# Patient Record
Sex: MALE | Race: BLACK OR AFRICAN AMERICAN | NOT HISPANIC OR LATINO | URBAN - METROPOLITAN AREA
[De-identification: names, ages, dates, MRNs, and addresses within clinical notes are randomized per-mention and may not be internally consistent; named-entity substitution may affect disease eponyms.]

---

## 2021-03-14 ENCOUNTER — EMERGENCY (EMERGENCY)
Facility: HOSPITAL | Age: 66
LOS: 1 days | Discharge: HOME | End: 2021-03-14
Admitting: EMERGENCY MEDICINE
Payer: COMMERCIAL

## 2021-03-14 VITALS
DIASTOLIC BLOOD PRESSURE: 80 MMHG | SYSTOLIC BLOOD PRESSURE: 190 MMHG | RESPIRATION RATE: 16 BRPM | TEMPERATURE: 98 F | HEART RATE: 66 BPM | OXYGEN SATURATION: 100 %

## 2021-03-14 PROCEDURE — 73610 X-RAY EXAM OF ANKLE: CPT | Mod: 26,LT

## 2021-03-14 PROCEDURE — 73590 X-RAY EXAM OF LOWER LEG: CPT | Mod: 26,LT

## 2021-03-14 PROCEDURE — 73630 X-RAY EXAM OF FOOT: CPT | Mod: 26,LT

## 2021-03-14 PROCEDURE — 99284 EMERGENCY DEPT VISIT MOD MDM: CPT

## 2021-03-14 RX ORDER — OXYCODONE HYDROCHLORIDE 5 MG/1
1 TABLET ORAL
Qty: 12 | Refills: 0
Start: 2021-03-14 | End: 2021-03-16

## 2021-03-14 RX ORDER — OXYCODONE AND ACETAMINOPHEN 5; 325 MG/1; MG/1
2 TABLET ORAL EVERY 4 HOURS
Refills: 0 | Status: DISCONTINUED | OUTPATIENT
Start: 2021-03-14 | End: 2021-03-14

## 2021-03-14 RX ORDER — OXYCODONE AND ACETAMINOPHEN 5; 325 MG/1; MG/1
2 TABLET ORAL ONCE
Refills: 0 | Status: DISCONTINUED | OUTPATIENT
Start: 2021-03-14 | End: 2021-03-14

## 2021-03-14 RX ADMIN — OXYCODONE AND ACETAMINOPHEN 2 TABLET(S): 5; 325 TABLET ORAL at 15:47

## 2021-03-14 NOTE — ED ADULT TRIAGE NOTE - CHIEF COMPLAINT QUOTE
Pt states that he tripped in [employer] and injured left ankle, pt denies LOC or head strike.  Past medical history: HTN, DM2

## 2021-03-14 NOTE — ED PROVIDER NOTE - PATIENT PORTAL LINK FT
You can access the FollowMyHealth Patient Portal offered by [facility] by registering at the following website: [email or web address]. By joining [email or web address]’s FollowMyHealth portal, you will also be able to view your health information using other applications (apps) compatible with our system.

## 2021-03-14 NOTE — ED PROVIDER NOTE - OBJECTIVE STATEMENT
64 Y/O M PMH HTN DM  states that 66 Y/O M PMH HTN DM  states that he was in [employer] when the pew rope got caught on his L ankle. Pt states this caused him to twist and hit the ground. Pt denies head trauma, LOC or A/C use. Pt states that he has 8/10 pain at his L ankle and has been unable to ambulate since the injury. Pt denies CP or Palpitations, pt denies any other sx or acute complaints.

## 2021-03-14 NOTE — ED PROVIDER NOTE - PROGRESS NOTE DETAILS
NEELAM Campoverde: Discussed with Podiatry resident, X-ray pending official read but has an obvious distal fibula fx. NEELAM Campoverde: Pt passed trial of crutches while in ED. Pt states he has 4 steps if he goes inside through his garage. Pt states his bedroom is upstairs but states he has a stair lift for his wife which he can also use. Pt  advised risks and benefits of staying in the hospital for PT eval and risk of COVID-19, pt prefers to go home on crutches and follow up with Podiatry.

## 2021-03-14 NOTE — CONSULT NOTE ADULT - SUBJECTIVE AND OBJECTIVE BOX
Podiatry pager #: [phone]/ [phone]    Patient is a 65y old  Male who presents with a chief complaint of left ankle fracture    HPI:  66 Y/O M PMH HTN DM  states that he fell earlier today at [employer] in an inverted manner and hurt his left ankle. Has had pain w/ ambulation.,      PAST MEDICAL & SURGICAL HISTORY:      MEDICATIONS  (STANDING):    MEDICATIONS  (PRN):      Allergies    aspirin (Unknown)    Intolerances        VITALS:    Vital Signs Last 24 Hrs  T(C): 36.9 (14 Mar 2021 15:07), Max: 36.9 (14 Mar 2021 15:07)  T(F): 98.4 (14 Mar 2021 15:07), Max: 98.4 (14 Mar 2021 15:07)  HR: 66 (14 Mar 2021 15:07) (66 - 66)  BP: 190/80 (14 Mar 2021 15:07) (190/80 - 190/80)  BP(mean): --  RR: 16 (14 Mar 2021 15:07) (16 - 16)  SpO2: 100% (14 Mar 2021 15:07) (100% - 100%)    LABS:                CAPILLARY BLOOD GLUCOSE              LOWER EXTREMITY PHYSICAL EXAM:    Vascular: DP/PT 2/4, B/L, CFT <3 seconds B/L, Temperature gradient WNL, B/L.   Neuro: Epicritic sensation intact to the level of digits, B/L.  Musculoskeletal/Ortho: Left ankle swelling, pain w/ palpation along distal lateral malleolus only, minimal pain w/ DF and PF of ankle. No open lesion, abrasions and no concern for compartment syndrome.        RADIOLOGY & ADDITIONAL STUDIES:    < from: Xray Tibia + Fibula 2 Views, Left (03.14.21 @ 16:45) >    INTERPRETATION:  CLINICAL INFORMATION: Status post fall. Pain.    STUDY: AP and lateral views of left tibia/fibula, 3 views of left ankle and foot.    COMPARISON: None.    FINDINGS:    Oblique, minimally displaced acute fracture of the distal fibula.  Soft tissue swelling of the ankle.  Joint spaces are maintained.  Degenerative changes with osteophyte and enthesophytes.    IMPRESSION:    Robert B ankle fracture.      < end of copied text >

## 2021-03-14 NOTE — ED ADULT NURSE NOTE - CHIEF COMPLAINT QUOTE
Pt states that he tripped in [employer] and injured left ankle, pt denies LOC or head strike.  Past medical history: HTN, DM2

## 2021-03-14 NOTE — ED PROVIDER NOTE - NSFOLLOWUPINSTRUCTIONS_ED_ALL_ED_FT
Your ankle is broken, you cannot put any weight on it at all. Use the crutches to get around. Follow up with Dr. Gigi Leon in the next week. Take Ibuprofen or Tylenol OTC as needed for pain and take Oxycodone every 6 hours as needed for severe or persistent pain. This medication can make you drowsy, do not drive or drink alcohol after use. Advance activity as tolerated.  Continue all previously prescribed medications as directed.  Follow up with your primary care physician in 48-72 hours- bring copies of your results.  Return to the ER for worsening or persistent symptoms, and/or ANY NEW OR CONCERNING SYMPTOMS such as severe pain, numbness, weakness, if you cannot use the crutches or for any other symptoms that concern you. If you have issues obtaining follow up, please call: [phone] to obtain a doctor or specialist who takes your insurance in your area.  You may call [phone] to make an appointment with the internal medicine clinic.

## 2021-03-14 NOTE — ED PROVIDER NOTE - PHYSICAL EXAMINATION
A comprehensive trauma exam was performed. No hematoma or skull tenderness or deformity, no tenderness or abnormality of facial bones. No spinous process or paraspinal muscle tenderness of the cervical, lumbar or thoracic spine. No rib tenderness/crepitus. Abdomen is soft, non-tender no guarding. Pelvis is stable. + Tenderness to L ankle diffusely, ROM is limited by associated pain. Strong pedal pulse, cap refill <2 seconds.  No other traumatic abnormality on comprehensive exam.

## 2021-03-14 NOTE — ED PROVIDER NOTE - NS ED MD DISPO DISCHARGE CCDA
Black stools  ? Bloating   ? Mucous in your stool  ? [place]   ? Change in bowel habits    Do you have allergies? ? Yes  If yes, please list: none  X No    Do you take Warfarin, Coumadin, Plavix, Eliquis, Xarelto, or aspirin OR do you take a medication that thins your blood? ? Yes  X No    Please list all of your medications including over-the-counter and herbal supplements  Amlodipine 10 mg    Metoprolol 50 mg                      List your past surgical procedures    colonoscopy    tonsilectomy                       Medical History  Do you have any history of:  ? None ? Heart Disease X Hypertension  ? Diabetes ? Seizures ? Respiratory/Asthma  ? Sleep Apnea ? G.E.R.D ? Blood Disorder  ? Vascular Disease ? Depression    List the medical problems you are being treated for    Hypertension                            History of MRSA?  no        Have you check with your insurance company to see what you BENEFITS are id you have had a colonoscopy? If you have not check with them we encourage you to find this information out before the procedure. Below are codes you may need to five them.        CPT and Diagnosis: FOR OFFICE USE ONLY  27233 Diagnostic colonoscopy- All patients Symptoms (check above or list):   27051 Screening colonoscopy for NON-MEDICARE patients Diagnosis code: Z12.11   Screening colonoscopy for MEDICARE patients Diagnosis code: Z12.11   Screening colonoscopy for MEDICARE- HIGH RISK PATIENTS   Z85.038- Personal history malignant neoplasm, large intestine   Z85.048- Personal history malignant neoplasm, rectum/anus   Z86.010- Personal history colon polyps   Z80.0- Family history malignant neoplasm   Z83.79- Family history digestive disorder    Reviewed by nurse: Jovanni Angel LPN      SURGERY SCHEDULED FOR 9-        ADDITIONAL NOTES: pt has history of squamous cell carcinoma of tonsils, dx 2011
Patient/Caregiver provided printed discharge information.

## 2021-03-14 NOTE — ED ADULT NURSE NOTE - INTERVENTIONS DEFINITIONS
Physically safe environment: no spills, clutter or unnecessary equipment/Monitor gait and stability/Monitor for mental status changes and reorient to person, place, and time

## 2021-03-14 NOTE — ED PROVIDER NOTE - CLINICAL SUMMARY MEDICAL DECISION MAKING FREE TEXT BOX
66 Y/O M PMH HTN DM  states that he was in [employer] when the pew rope got caught on his L ankle. Pt states this caused him to twist and hit the ground. Pt denies head trauma, LOC or A/C use. Pt states that he has 8/10 pain at his L ankle and has been unable to ambulate since the injury. Plan is X-ray to eval for FX 64 Y/O M PMH HTN DM  states that he was in [employer] when the pew rope got caught on his L ankle. Pt states this caused him to twist and hit the ground. Pt denies head trauma, LOC or A/C use. Pt states that he has 8/10 pain at his L ankle and has been unable to ambulate since the injury. Plan is X-ray to eval for FX, if + for fracture will consult podiatry.

## 2021-03-14 NOTE — ED PROVIDER NOTE - CARE PROVIDER_API CALL
Gigi Leon (DPM)  Foot Surgery; Recon RearfootAnkle Surgery  [address] [place] [address]  Phone: [phone]  Fax: [phone]  Follow Up Time:

## 2021-03-14 NOTE — ED ADULT NURSE NOTE - OBJECTIVE STATEMENT
Received Pt in intake room 6. pt A&ox3. Pt with hx of HTN, DM2. Pt states that he tripped in [employer] and injured left ankle, pt denies LOC or head strike. pt appears stable and in no distress. respirations are equal and nonlabored, no respiratory distress noted. pt denies any other complaints at this time. pt stable, medicated as per orders. awaiting further plan.

## 2021-03-14 NOTE — CONSULT NOTE ADULT - ASSESSMENT
Pt is 64yo who presents w/ left ankle pain  -pt was seen and evaluated  -Left ankle swelling, pain w/ palpation along distal lateral malleolus only, minimal pain w/ DF and PF of ankle. No open lesion, abrasions and no concern for compartment syndrome.  -Left ankle xray: minimally displaced distal lateral mal fracture  -LLE splinted  -Pt to remain NWB in posterior splint w/ use of crutches and to make an appointment w/n the week w/ Dr. Leon at [phone]  -Discussed w/ attending

## 2021-03-14 NOTE — ED PROVIDER NOTE - WR ORDER ID 1
Anticoagulation Summary  As of 2019    INR goal:   2.0-3.0   TTR:   48.6 % (2.6 y)   INR used for dosin.70! (2019)   Warfarin maintenance plan:   10 mg (5 mg x 2) every Mon, Fri; 5 mg (5 mg x 1) all other days   Weekly warfarin total:   45 mg   Plan last modified:   Winnie Raman (2019)   Next INR check:   2019   Target end date:   Indefinite    Indications    Acute pulmonary embolism (HCC) (Resolved) [I26.99]  Chronic anticoagulation [Z79.01]             Anticoagulation Episode Summary     INR check location:       Preferred lab:       Send INR reminders to:       Comments:         Anticoagulation Care Providers     Provider Role Specialty Phone number    [facility]   [phone]    Jessica Li M.D.  Family Medicine [phone]    Argentina Holman, PharmD           Anticoagulation Patient Findings  Patient Findings     Negatives:   Signs/symptoms of thrombosis, Signs/symptoms of bleeding, Laboratory test error suspected, Change in health, Change in alcohol use, Change in activity, Upcoming invasive procedure, Emergency department visit, Upcoming dental procedure, Missed doses, Extra doses, Change in medications, Change in diet/appetite, Hospital admission, Bruising, Other complaints            History of Present Illness: follow up appointment for chronic anticoagulation with the high risk medication, warfarin for PE    Last INR was out of range, dosage adjusted: pt remains supra therapeutic trending upward.  Will HOLD warfarin today, and reduce weekly dose by 15%  Follow up in 2 weeks, to reduce the risk of adverse events related to this high risk medication, warfarin.    Winnie Raman, Clinical Pharmacist  Pt declines vitals today         [id number]

## 2022-08-17 ENCOUNTER — APPOINTMENT (OUTPATIENT)
Dept: ENDOCRINOLOGY | Facility: CLINIC | Age: 67
End: 2022-08-17

## 2022-08-17 VITALS
BODY MASS INDEX: 30.62 KG/M2 | HEIGHT: 68 IN | DIASTOLIC BLOOD PRESSURE: 68 MMHG | SYSTOLIC BLOOD PRESSURE: 122 MMHG | WEIGHT: 202 LBS

## 2022-08-17 LAB — HBA1C MFR BLD HPLC: 9.8

## 2022-08-17 PROCEDURE — 83036 HEMOGLOBIN GLYCOSYLATED A1C: CPT | Mod: QW

## 2022-08-17 PROCEDURE — 99205 OFFICE O/P NEW HI 60 MIN: CPT | Mod: 25

## 2022-08-17 RX ORDER — PRAVASTATIN SODIUM 40 MG/1
40 TABLET ORAL
Qty: 90 | Refills: 0 | Status: ACTIVE | COMMUNITY
Start: 2022-05-05

## 2022-08-17 RX ORDER — TADALAFIL 5 MG/1
5 TABLET ORAL
Qty: 45 | Refills: 0 | Status: DISCONTINUED | COMMUNITY
Start: 2022-07-25

## 2022-08-17 RX ORDER — LIFITEGRAST 50 MG/ML
5 SOLUTION/ DROPS OPHTHALMIC
Qty: 60 | Refills: 0 | Status: ACTIVE | COMMUNITY
Start: 2022-05-13

## 2022-08-17 RX ORDER — LOSARTAN POTASSIUM 25 MG/1
25 TABLET, FILM COATED ORAL
Qty: 90 | Refills: 0 | Status: DISCONTINUED | COMMUNITY
Start: 2022-06-13

## 2022-08-17 RX ORDER — CARVEDILOL 25 MG/1
25 TABLET, FILM COATED ORAL
Qty: 180 | Refills: 0 | Status: ACTIVE | COMMUNITY
Start: 2022-04-04

## 2022-08-17 NOTE — PHYSICAL EXAM
[Alert] : alert [No Acute Distress] : no acute distress [Normal Sclera/Conjunctiva] : normal sclera/conjunctiva [EOMI] : extra ocular movement intact [No LAD] : no lymphadenopathy [Thyroid Not Enlarged] : the thyroid was not enlarged [No Thyroid Nodules] : no palpable thyroid nodules [No Respiratory Distress] : no respiratory distress [Clear to Auscultation] : lungs were clear to auscultation bilaterally [Normal S1, S2] : normal S1 and S2 [Regular Rhythm] : with a regular rhythm [No Edema] : no peripheral edema [Normal Bowel Sounds] : normal bowel sounds [Not Tender] : non-tender [Not Distended] : not distended [Soft] : abdomen soft [Normal Anterior Cervical Nodes] : no anterior cervical lymphadenopathy [No Clubbing, Cyanosis] : no clubbing  or cyanosis of the fingernails [No Rash] : no rash [Right foot was examined, including] : right foot ~C was examined, including visual inspection with sensory and pulse exams [Left foot was examined, including] : left foot ~C was examined, including visual inspection with sensory and pulse exams [Normal] : normal [2+] : 2+ in the dorsalis pedis [Diminished Throughout Both Feet] : diminished tactile sensation with monofilament testing throughout both feet [Normal Reflexes] : deep tendon reflexes were 2+ and symmetric [Normal Affect] : the affect was normal [Normal Mood] : the mood was normal

## 2022-08-18 NOTE — HISTORY OF PRESENT ILLNESS
[FreeTextEntry1] : 66 y.o. male with diagnosed with Type 2 DM in 1998 presents for management. \par \par Currently taking Jardiance 25 mg daily and Basaglar/Lantus 18 units SQ daily. \par \par He used to take glyburide/metformin and reports abdominal pain with metformin so stopped it. He used to take Januvia but stopped it because it was not helping. \par \par Does have retinopathy and UTD with ophthalmology. Not aware of kidney disease. Does report left toe numbness following ankle injury. Does have a podiatrist but does not see regularly. \par \par Did see cardiology and denies heart disease. \par \par Checks FS in the morning and are 90s to 120s with One Touch Ultra\par \par Diet:\par Breakfast: eggs or bagel with cream cheese or salad\par Lunch: vegetables with chicken or fish or steak and plantain\par Dinner: plantain with protein\par Drinks: fruit juice, water\par Snacks: cookies\par \par Does exercise Monday through Friday. Does walking at the park. \par \par Overall feeling well. Reports polyuria but no polydipsia. No SOB or CP. Reports constipation and GERD.

## 2022-08-18 NOTE — REASON FOR VISIT
[Consultation] : a consultation visit [DM Type 2] : DM Type 2 [Family Member] : family member [FreeTextEntry2] : Dr. Bennie Longoria

## 2022-08-18 NOTE — REVIEW OF SYSTEMS
[Recent Weight Loss (___ Lbs)] : recent weight loss: [unfilled] lbs [Dry Eyes] : dryness [Constipation] : constipation [Heartburn] : heartburn [Polyuria] : polyuria [Pain/Numbness of Digits] : pain/numbness of digits [Negative] : Integumentary [Fatigue] : no fatigue [Polydipsia] : no polydipsia [Cold Intolerance] : no cold intolerance [Heat Intolerance] : no heat intolerance [Swelling] : no swelling [FreeTextEntry3] : decrease  in night vision [de-identified] : mild depression since spouse's death

## 2022-08-18 NOTE — CONSULT LETTER
[Dear  ___] : Dear  [unfilled], [Consult Letter:] : I had the pleasure of evaluating your patient, [unfilled]. [( Thank you for referring [unfilled] for consultation for _____ )] : Thank you for referring [unfilled] for consultation for [unfilled] [Please see my note below.] : Please see my note below. [Consult Closing:] : Thank you very much for allowing me to participate in the care of this patient.  If you have any questions, please do not hesitate to contact me. [Sincerely,] : Sincerely, [FreeTextEntry2] : Bennie Longoria MD\par [phone]\[place] [address] [FreeTextEntry3] : Rui Martínez DO\par  of Medicine\par [facility] [place]\par

## 2022-08-18 NOTE — ASSESSMENT
[Diabetes Foot Care] : diabetes foot care [Long Term Vascular Complications] : long term vascular complications of diabetes [Carbohydrate Consistent Diet] : carbohydrate consistent diet [Importance of Diet and Exercise] : importance of diet and exercise to improve glycemic control, achieve weight loss and improve cardiovascular health [FreeTextEntry1] : 66 y.o. male with h/o Type 2 DM with retinopathy, HTN and hyperlipidemia.\par \par 1. Type 2 DM- Poor control with Hba1c of 9.8%. Discussed pathophysiology. Reviewed blood glucose and Hba1c goals. Encouraged a carbohydrate consistent diet and exercise. Dietary literature was provided to the patient. Encouraged patient to meet with our RD. Will start Ozempic 0.25 mg SQ weekly. Recommend taking Ozempic 0.25 mg SQ weekly for 4 weeks and if tolerating will increase to 0.5 mg SQ weekly. Reviewed risks and benefits of GLP-1 agonists including MTC and pancreatitis including GI side effects. Patient received an Ozempic pen teach with our CDE today. For now, will continue Basaglar 18 units SQ daily and Jardiance 25 mg daily. If patient is intolerant to GLP-1 agonist then will need to consider transition to basal bolus regimen. Also discussed possibly starting Metformin ER to assist with insulin resistance. Will obtain CMP and urine alb/cr ratio from PCP.\par \par 2. HTN- BP goal is 130/80 or less. Will continue current medications including ARB (Losartan).\par \par 3. Hyperlipidemia- Will follow up recent lipid panel. Encouraged low fat diet. Will continue Pravastatin 40 mg daily.\par \par \par Follow up in 3 months

## 2022-11-16 ENCOUNTER — APPOINTMENT (OUTPATIENT)
Dept: ENDOCRINOLOGY | Facility: CLINIC | Age: 67
End: 2022-11-16

## 2022-12-09 ENCOUNTER — APPOINTMENT (OUTPATIENT)
Dept: ENDOCRINOLOGY | Facility: CLINIC | Age: 67
End: 2022-12-09

## 2022-12-09 VITALS
OXYGEN SATURATION: 98 % | SYSTOLIC BLOOD PRESSURE: 142 MMHG | WEIGHT: 188 LBS | BODY MASS INDEX: 28.49 KG/M2 | HEART RATE: 61 BPM | HEIGHT: 68 IN | DIASTOLIC BLOOD PRESSURE: 86 MMHG

## 2022-12-09 VITALS — SYSTOLIC BLOOD PRESSURE: 150 MMHG | DIASTOLIC BLOOD PRESSURE: 80 MMHG

## 2022-12-09 LAB — HBA1C MFR BLD HPLC: 8.6

## 2022-12-09 PROCEDURE — 99214 OFFICE O/P EST MOD 30 MIN: CPT | Mod: 25

## 2022-12-09 PROCEDURE — 95251 CONT GLUC MNTR ANALYSIS I&R: CPT

## 2022-12-09 PROCEDURE — 83036 HEMOGLOBIN GLYCOSYLATED A1C: CPT | Mod: QW

## 2022-12-09 RX ORDER — SEMAGLUTIDE 1.34 MG/ML
2 INJECTION, SOLUTION SUBCUTANEOUS
Qty: 3 | Refills: 3 | Status: DISCONTINUED | COMMUNITY
Start: 2022-08-17 | End: 2022-12-09

## 2022-12-10 NOTE — HISTORY OF PRESENT ILLNESS
[FreeTextEntry1] : 67 y.o. male with Type 2 DM diagnosed in 1998 presents for follow up visit. \par \par Currently taking glipizide ER 5 mg daily, Jardiance 25 mg daily and Basaglar/Lantus 18 units SQ daily. Unable to afford GLP-1 agonist.\par \par He used to take glyburide/metformin and reports abdominal pain with metformin so stopped it. He used to take Januvia but stopped it because it was not helping. \par \par Does have retinopathy and UTD with ophthalmology. Not aware of kidney disease. Does report left toe numbness following ankle injury. Does have a podiatrist but does not see regularly. \par \par Did see cardiology and denies heart disease. \par \par Checks FS in the morning and are 90s to 120s with One Touch Ultra. Also using Freestyle Paty now. \par \par Diet:\par Breakfast: eggs or bagel with cream cheese but rarely or salad\par Lunch: vegetables with chicken or fish or steak and plantain\par Dinner: plantain with protein\par Drinks: fruit juice, water\par Snacks: cookies\par \par Does exercise Monday through Friday. Less outdoor walking now given weather.  \par \par Overall feeling well. Reports polyuria but no polydipsia. No SOB or CP. Reports less constipation and GERD. \par \par Sees cardiology (last visit on 12/8/22) Dr. Lopez Deras in [place] [phone] [Continuous Glucose Monitoring] : Continuous Glucose Monitoring: Yes [Paty] : Paty [FreeTextEntry2] : 44 [FreeTextEntry3] : 56 [FreeTextEntry4] : 0 [de-identified] : 8.3%

## 2022-12-10 NOTE — ASSESSMENT
[Diabetes Foot Care] : diabetes foot care [Long Term Vascular Complications] : long term vascular complications of diabetes [Carbohydrate Consistent Diet] : carbohydrate consistent diet [Importance of Diet and Exercise] : importance of diet and exercise to improve glycemic control, achieve weight loss and improve cardiovascular health [FreeTextEntry1] : 67 y.o. male with h/o Type 2 DM with retinopathy, HTN and hyperlipidemia.\par \par 1. Type 2 DM- Poor control with Hba1c of 8.6% but improved. Discussed pathophysiology. Reviewed blood glucose and Hba1c goals. Encouraged a carbohydrate consistent diet and exercise. Encouraged patient to meet with our RD. Freestyle Paty download reviewed today which shows postprandial hyperglycemia. Will increase glipizide ER to 10 mg daily. For now, will continue Basaglar 18 units SQ daily and Jardiance 25 mg daily. Unable to afford GLP-1 agonist. Intolerant to metformin. Will obtain CMP and urine alb/cr ratio from PCP.\par \par 2. HTN- BP goal is 130/80 or less. Will continue current medications including ARB (Losartan). Recommend follow up with PCP and/or cardiology. \par \par 3. Hyperlipidemia- Will follow up recent lipid panel. Encouraged low fat diet. Will continue Pravastatin 40 mg daily.\par \par \par Follow up in 3 to 4 months

## 2022-12-10 NOTE — REVIEW OF SYSTEMS
[Dry Eyes] : dryness [Constipation] : constipation [Heartburn] : heartburn [Polyuria] : polyuria [Pain/Numbness of Digits] : pain/numbness of digits [Negative] : Integumentary [Fatigue] : no fatigue [Recent Weight Loss (___ Lbs)] : recent weight loss: [unfilled] lbs [Polydipsia] : no polydipsia [Cold Intolerance] : no cold intolerance [Heat Intolerance] : no heat intolerance [Swelling] : no swelling [FreeTextEntry3] : decrease  in night vision [de-identified] : mild depression since spouse's death

## 2023-05-12 ENCOUNTER — APPOINTMENT (OUTPATIENT)
Dept: ENDOCRINOLOGY | Facility: CLINIC | Age: 68
End: 2023-05-12
Payer: MEDICARE

## 2023-05-12 VITALS
HEART RATE: 64 BPM | SYSTOLIC BLOOD PRESSURE: 104 MMHG | DIASTOLIC BLOOD PRESSURE: 60 MMHG | HEIGHT: 68 IN | OXYGEN SATURATION: 98 %

## 2023-05-12 PROCEDURE — 99215 OFFICE O/P EST HI 40 MIN: CPT | Mod: 25

## 2023-05-12 PROCEDURE — 95251 CONT GLUC MNTR ANALYSIS I&R: CPT

## 2023-05-12 RX ORDER — GLIPIZIDE 10 MG/1
10 TABLET, FILM COATED, EXTENDED RELEASE ORAL DAILY
Qty: 90 | Refills: 3 | Status: DISCONTINUED | COMMUNITY
Start: 2022-08-22 | End: 2023-05-12

## 2023-05-12 RX ORDER — LOSARTAN POTASSIUM 50 MG/1
50 TABLET, FILM COATED ORAL
Qty: 90 | Refills: 0 | Status: DISCONTINUED | COMMUNITY
Start: 2022-07-05 | End: 2023-05-12

## 2023-05-12 RX ORDER — HYDRALAZINE HYDROCHLORIDE 100 MG/1
100 TABLET ORAL TWICE DAILY
Refills: 0 | Status: ACTIVE | COMMUNITY
Start: 2022-06-27

## 2023-05-13 NOTE — DATA REVIEWED
[FreeTextEntry1] : Labs\par 4/25/23\par chol 134 HDL 47\par LDL 68 tri 94\par H/H 14.2/44.1\par glucose 266\par Hba1c 9.6%\par TSH 0.43\par Free T4 1.12\par urine alb/cr ratio 9.3\par

## 2023-05-13 NOTE — REVIEW OF SYSTEMS
[Dry Eyes] : dryness [Constipation] : constipation [Heartburn] : heartburn [Polyuria] : polyuria [Erectile Dysfunction] : erectile dysfunction [Pain/Numbness of Digits] : pain/numbness of digits [Negative] : Integumentary [Fatigue] : no fatigue [Polydipsia] : no polydipsia [Cold Intolerance] : no cold intolerance [Heat Intolerance] : no heat intolerance [Swelling] : no swelling [FreeTextEntry3] : decrease  in night vision

## 2023-05-13 NOTE — HISTORY OF PRESENT ILLNESS
[Continuous Glucose Monitoring] : Continuous Glucose Monitoring: Yes [Paty] : Paty [FreeTextEntry1] : 67 y.o. male with Type 2 DM diagnosed in 1998 presents for follow up visit. \par \par Currently taking glipizide ER 10 mg daily, Jardiance 25 mg daily and Basaglar/Lantus 20 units SQ daily. Unable to afford GLP-1 agonist.\par \par He used to take glyburide/metformin and reports abdominal pain with metformin so stopped it. He used to take Januvia but stopped it because it was not helping. \par \par Does have retinopathy and UTD with ophthalmology. Not aware of kidney disease. Does report left toe numbness following ankle injury. Does have a podiatrist but does not see regularly. \par \par Did see cardiology and denies heart disease. \par \par He is using Freestyle Paty now. \par \par Diet:\par Breakfast: croissant with coffee\par Lunch: vegetables with chicken or fish or steak and plantain\par Dinner: rice with protein\par Drinks: less fruit juice, water\par Snacks: cookies\par \par Less outdoor walking now.  \par \par Overall feeling well. Reports polyuria but no polydipsia. No SOB or CP. Reports less constipation and GERD. Planning to see GI.\par \par Sees cardiology (last visit on 12/8/22) Dr. Lopez Deras in [place] [phone]. Stopped Losartan secondary to hyperkalemia.  [FreeTextEntry3] : 84 [FreeTextEntry2] : 16 [FreeTextEntry4] : 0 [de-identified] : 9.6%

## 2023-05-13 NOTE — ASSESSMENT
[Diabetes Foot Care] : diabetes foot care [Long Term Vascular Complications] : long term vascular complications of diabetes [Carbohydrate Consistent Diet] : carbohydrate consistent diet [Importance of Diet and Exercise] : importance of diet and exercise to improve glycemic control, achieve weight loss and improve cardiovascular health [FreeTextEntry1] : 67 y.o. male with h/o Type 2 DM with retinopathy, HTN and hyperlipidemia.\par \par 1. Type 2 DM- Poor control with Hba1c of 9.6% in April 2023. Discussed pathophysiology. Reviewed blood glucose and Hba1c goals. Encouraged a carbohydrate consistent diet and exercise. Encouraged patient to meet with our RD. Freestyle Paty download reviewed today with 16% in range which shows postprandial hyperglycemia. Will discontinue glipizide ER 10 mg daily. For now, will continue Basaglar 20 units SQ daily and Jardiance 25 mg daily. Will start Humalog 6 units QAC with 2/50 correction if above 150. Unable to afford GLP-1 agonist. Intolerant to metformin. UTD with CMP and urine alb/cr ratio done in April 2023. Potassium elevated but hemolyzed specimen and will follow up with PCP. Encouraged patient to follow up in the office in 1 to 2 weeks so we can review Paty data and adjust insulin regimen if needed. \par \par 2. HTN- BP goal is 130/80 or less. Will continue current medications; however ARB (Losartan) stopped given hyperkalemia. Recommend follow up with PCP and/or cardiology. \par \par 3. Hyperlipidemia- Lipids are at goal. Encouraged low fat diet. Will continue Pravastatin 40 mg daily.\par \par \par Follow up in 3 to 4 months

## 2023-08-29 ENCOUNTER — APPOINTMENT (OUTPATIENT)
Dept: GERIATRICS | Facility: CLINIC | Age: 68
End: 2023-08-29
Payer: MEDICARE

## 2023-08-29 VITALS
DIASTOLIC BLOOD PRESSURE: 72 MMHG | RESPIRATION RATE: 16 BRPM | HEIGHT: 68 IN | BODY MASS INDEX: 29.46 KG/M2 | SYSTOLIC BLOOD PRESSURE: 144 MMHG | WEIGHT: 194.38 LBS | TEMPERATURE: 97.3 F | OXYGEN SATURATION: 98 % | HEART RATE: 68 BPM

## 2023-08-29 VITALS — SYSTOLIC BLOOD PRESSURE: 130 MMHG | DIASTOLIC BLOOD PRESSURE: 70 MMHG

## 2023-08-29 DIAGNOSIS — Z63.4 DISAPPEARANCE AND DEATH OF FAMILY MEMBER: ICD-10-CM

## 2023-08-29 DIAGNOSIS — M54.2 CERVICALGIA: ICD-10-CM

## 2023-08-29 PROCEDURE — 99205 OFFICE O/P NEW HI 60 MIN: CPT | Mod: GC

## 2023-08-29 RX ORDER — INSULIN GLARGINE 100 [IU]/ML
100 INJECTION, SOLUTION SUBCUTANEOUS
Qty: 1 | Refills: 3 | Status: DISCONTINUED | COMMUNITY
Start: 2022-07-05 | End: 2023-08-29

## 2023-08-29 SDOH — SOCIAL STABILITY - SOCIAL INSECURITY: DISSAPEARANCE AND DEATH OF FAMILY MEMBER: Z63.4

## 2023-09-02 LAB
ALBUMIN SERPL ELPH-MCNC: 4 G/DL
ALP BLD-CCNC: 57 U/L
ALT SERPL-CCNC: 18 U/L
ANION GAP SERPL CALC-SCNC: 14 MMOL/L
AST SERPL-CCNC: 16 U/L
BASOPHILS # BLD AUTO: 0.04 K/UL
BASOPHILS NFR BLD AUTO: 0.6 %
BILIRUB SERPL-MCNC: 0.4 MG/DL
BUN SERPL-MCNC: 19 MG/DL
CALCIUM SERPL-MCNC: 9.2 MG/DL
CHLORIDE SERPL-SCNC: 97 MMOL/L
CHOLEST SERPL-MCNC: 165 MG/DL
CO2 SERPL-SCNC: 22 MMOL/L
CREAT SERPL-MCNC: 0.83 MG/DL
EGFR: 95 ML/MIN/1.73M2
EOSINOPHIL # BLD AUTO: 0.34 K/UL
EOSINOPHIL NFR BLD AUTO: 5.2 %
GLUCOSE SERPL-MCNC: 244 MG/DL
HCT VFR BLD CALC: 44.6 %
HDLC SERPL-MCNC: 55 MG/DL
HGB BLD-MCNC: 14.4 G/DL
IMM GRANULOCYTES NFR BLD AUTO: 0.3 %
LDLC SERPL CALC-MCNC: 91 MG/DL
LYMPHOCYTES # BLD AUTO: 2.08 K/UL
LYMPHOCYTES NFR BLD AUTO: 32 %
MAN DIFF?: NORMAL
MCHC RBC-ENTMCNC: 28.5 PG
MCHC RBC-ENTMCNC: 32.3 GM/DL
MCV RBC AUTO: 88.1 FL
MONOCYTES # BLD AUTO: 0.65 K/UL
MONOCYTES NFR BLD AUTO: 10 %
NEUTROPHILS # BLD AUTO: 3.38 K/UL
NEUTROPHILS NFR BLD AUTO: 51.9 %
NONHDLC SERPL-MCNC: 111 MG/DL
PLATELET # BLD AUTO: 197 K/UL
POTASSIUM SERPL-SCNC: 4.4 MMOL/L
PROLACTIN SERPL-MCNC: 7.8 NG/ML
PROT SERPL-MCNC: 6.4 G/DL
RBC # BLD: 5.06 M/UL
RBC # FLD: 13.4 %
SODIUM SERPL-SCNC: 133 MMOL/L
TESTOST FREE SERPL-MCNC: 3.4 PG/ML
TESTOST SERPL-MCNC: 380 NG/DL
TRIGL SERPL-MCNC: 107 MG/DL
TSH SERPL-ACNC: 0.47 UIU/ML
WBC # FLD AUTO: 6.51 K/UL

## 2023-09-04 PROBLEM — Z63.4 WIDOWED: Status: ACTIVE | Noted: 2023-09-04

## 2023-09-04 NOTE — END OF VISIT
[] : Fellow [FreeTextEntry3] : Agree with the [place] assessment and plan.  I have reviewed and edited the note above as needed.  Will do w/u of sexual dysfunction. May be product of diabetes and neurogenic issue. Will f/u  [Time Spent: ___ minutes] : I have spent [unfilled] minutes of time on the encounter.

## 2023-09-04 NOTE — REASON FOR VISIT
[FreeTextEntry1] : Establish PCP [Initial Evaluation] : an initial evaluation [Family Member] : family member [FreeTextEntry3] : Sister

## 2023-09-04 NOTE — HISTORY OF PRESENT ILLNESS
[Patient reported hearing was normal] : Patient reported hearing was normal [Patient reported vision is normal] : Patient reported vision is normal [No falls in past year] : Patient reported no falls in the past year [Completely Independent] : Completely independent. [0] : 2) Feeling down, depressed, or hopeless: Not at all (0) [FreeTextEntry1] : The pt 66 yo M with PMH of IDDM, HTN, HLD presented to establish care. He mentioned of 2 complaints today- Lack of sexual desire and 1 episode of neck pain   1. Sexual dysfunction - For the past 2 years since wife passed in 2021 - Lack of sexual desire - Premature ejaculation  - saw urology where he did some test (could not specify) which showed he has less blood flow to genitals - was recommended Viagra by urology but pt did not take it due to concern of low BP    2. Neck pain  - had an episode of neck pain 2 weeks prior while he was watching TV after dinner - Could not specify the character - took tylenol and got massage which relieved - never had an injury   Otherwise denies any other physical complaints  [TextBox_25] : Never done

## 2023-09-19 ENCOUNTER — OUTPATIENT (OUTPATIENT)
Dept: OUTPATIENT SERVICES | Facility: HOSPITAL | Age: 68
LOS: 1 days | End: 2023-09-19
Payer: MEDICARE

## 2023-09-19 ENCOUNTER — APPOINTMENT (OUTPATIENT)
Dept: CT IMAGING | Facility: CLINIC | Age: 68
End: 2023-09-19
Payer: MEDICARE

## 2023-09-19 DIAGNOSIS — R10.13 EPIGASTRIC PAIN: ICD-10-CM

## 2023-09-19 DIAGNOSIS — R10.11 RIGHT UPPER QUADRANT PAIN: ICD-10-CM

## 2023-09-19 PROCEDURE — 74177 CT ABD & PELVIS W/CONTRAST: CPT

## 2023-09-19 PROCEDURE — 74177 CT ABD & PELVIS W/CONTRAST: CPT | Mod: 26

## 2023-09-22 ENCOUNTER — APPOINTMENT (OUTPATIENT)
Dept: ENDOCRINOLOGY | Facility: CLINIC | Age: 68
End: 2023-09-22
Payer: MEDICARE

## 2023-09-22 VITALS
SYSTOLIC BLOOD PRESSURE: 120 MMHG | BODY MASS INDEX: 29.55 KG/M2 | WEIGHT: 195 LBS | DIASTOLIC BLOOD PRESSURE: 66 MMHG | OXYGEN SATURATION: 98 % | HEIGHT: 68 IN | HEART RATE: 68 BPM

## 2023-09-22 LAB — HBA1C MFR BLD HPLC: 9.3

## 2023-09-22 PROCEDURE — 95251 CONT GLUC MNTR ANALYSIS I&R: CPT

## 2023-09-22 PROCEDURE — 99215 OFFICE O/P EST HI 40 MIN: CPT | Mod: 25

## 2023-09-22 PROCEDURE — 83036 HEMOGLOBIN GLYCOSYLATED A1C: CPT | Mod: QW

## 2023-10-02 ENCOUNTER — APPOINTMENT (OUTPATIENT)
Dept: ULTRASOUND IMAGING | Facility: CLINIC | Age: 68
End: 2023-10-02
Payer: MEDICARE

## 2023-10-02 ENCOUNTER — OUTPATIENT (OUTPATIENT)
Dept: OUTPATIENT SERVICES | Facility: HOSPITAL | Age: 68
LOS: 1 days | End: 2023-10-02
Payer: MEDICARE

## 2023-10-02 DIAGNOSIS — Z12.9 ENCOUNTER FOR SCREENING FOR MALIGNANT NEOPLASM, SITE UNSPECIFIED: ICD-10-CM

## 2023-10-02 PROCEDURE — 76700 US EXAM ABDOM COMPLETE: CPT | Mod: 26

## 2023-10-02 PROCEDURE — 76700 US EXAM ABDOM COMPLETE: CPT

## 2023-10-10 ENCOUNTER — APPOINTMENT (OUTPATIENT)
Dept: GERIATRICS | Facility: CLINIC | Age: 68
End: 2023-10-10

## 2023-10-24 ENCOUNTER — APPOINTMENT (OUTPATIENT)
Dept: GERIATRICS | Facility: CLINIC | Age: 68
End: 2023-10-24
Payer: MEDICARE

## 2023-10-24 VITALS
HEIGHT: 68 IN | OXYGEN SATURATION: 99 % | WEIGHT: 197 LBS | RESPIRATION RATE: 16 BRPM | BODY MASS INDEX: 29.86 KG/M2 | TEMPERATURE: 97.1 F | DIASTOLIC BLOOD PRESSURE: 62 MMHG | SYSTOLIC BLOOD PRESSURE: 172 MMHG | HEART RATE: 65 BPM

## 2023-10-24 VITALS — SYSTOLIC BLOOD PRESSURE: 140 MMHG | DIASTOLIC BLOOD PRESSURE: 80 MMHG

## 2023-10-24 DIAGNOSIS — Z86.39 PERSONAL HISTORY OF OTHER ENDOCRINE, NUTRITIONAL AND METABOLIC DISEASE: ICD-10-CM

## 2023-10-24 PROCEDURE — 90662 IIV NO PRSV INCREASED AG IM: CPT

## 2023-10-24 PROCEDURE — G0008: CPT

## 2023-10-24 PROCEDURE — 99214 OFFICE O/P EST MOD 30 MIN: CPT | Mod: 25

## 2023-10-24 RX ORDER — DEXLANSOPRAZOLE 30 MG/1
30 CAPSULE, DELAYED RELEASE ORAL DAILY
Qty: 30 | Refills: 0 | Status: ACTIVE | COMMUNITY
Start: 2023-10-24

## 2023-10-24 RX ORDER — PANTOPRAZOLE 40 MG/1
40 TABLET, DELAYED RELEASE ORAL
Qty: 180 | Refills: 0 | Status: DISCONTINUED | COMMUNITY
Start: 2022-06-13 | End: 2023-10-24

## 2023-10-30 LAB
ANION GAP SERPL CALC-SCNC: 7 MMOL/L
BUN SERPL-MCNC: 14 MG/DL
CALCIUM SERPL-MCNC: 9.2 MG/DL
CHLORIDE SERPL-SCNC: 96 MMOL/L
CO2 SERPL-SCNC: 28 MMOL/L
CREAT SERPL-MCNC: 0.74 MG/DL
EGFR: 98 ML/MIN/1.73M2
GLUCOSE SERPL-MCNC: 261 MG/DL
POTASSIUM SERPL-SCNC: 4.7 MMOL/L
SODIUM SERPL-SCNC: 132 MMOL/L

## 2023-12-05 ENCOUNTER — APPOINTMENT (OUTPATIENT)
Dept: GERIATRICS | Facility: CLINIC | Age: 68
End: 2023-12-05
Payer: MEDICARE

## 2023-12-05 VITALS
BODY MASS INDEX: 29.5 KG/M2 | SYSTOLIC BLOOD PRESSURE: 155 MMHG | DIASTOLIC BLOOD PRESSURE: 62 MMHG | RESPIRATION RATE: 14 BRPM | OXYGEN SATURATION: 98 % | WEIGHT: 194 LBS | HEART RATE: 69 BPM | TEMPERATURE: 97.6 F

## 2023-12-05 VITALS — SYSTOLIC BLOOD PRESSURE: 130 MMHG | DIASTOLIC BLOOD PRESSURE: 80 MMHG

## 2023-12-05 PROCEDURE — 99214 OFFICE O/P EST MOD 30 MIN: CPT | Mod: GC

## 2023-12-07 DIAGNOSIS — E87.1 HYPO-OSMOLALITY AND HYPONATREMIA: ICD-10-CM

## 2023-12-07 DIAGNOSIS — E87.5 HYPERKALEMIA: ICD-10-CM

## 2023-12-07 LAB
ALBUMIN SERPL ELPH-MCNC: 4.3 G/DL
ALP BLD-CCNC: 61 U/L
ALT SERPL-CCNC: 15 U/L
ANION GAP SERPL CALC-SCNC: 10 MMOL/L
AST SERPL-CCNC: 25 U/L
BASOPHILS # BLD AUTO: 0.05 K/UL
BASOPHILS NFR BLD AUTO: 0.9 %
BILIRUB SERPL-MCNC: 0.4 MG/DL
BUN SERPL-MCNC: 15 MG/DL
CALCIUM SERPL-MCNC: 9.8 MG/DL
CHLORIDE SERPL-SCNC: 95 MMOL/L
CO2 SERPL-SCNC: 24 MMOL/L
CREAT SERPL-MCNC: 0.72 MG/DL
EGFR: 98 ML/MIN/1.73M2
EOSINOPHIL # BLD AUTO: 0.14 K/UL
EOSINOPHIL NFR BLD AUTO: 2.4 %
GLUCOSE SERPL-MCNC: 335 MG/DL
HCT VFR BLD CALC: 46 %
HGB BLD-MCNC: 14.4 G/DL
IMM GRANULOCYTES NFR BLD AUTO: 0.3 %
LYMPHOCYTES # BLD AUTO: 2.03 K/UL
LYMPHOCYTES NFR BLD AUTO: 35.4 %
MAN DIFF?: NORMAL
MCHC RBC-ENTMCNC: 27.7 PG
MCHC RBC-ENTMCNC: 31.3 GM/DL
MCV RBC AUTO: 88.5 FL
MONOCYTES # BLD AUTO: 0.58 K/UL
MONOCYTES NFR BLD AUTO: 10.1 %
NEUTROPHILS # BLD AUTO: 2.92 K/UL
NEUTROPHILS NFR BLD AUTO: 50.9 %
PLATELET # BLD AUTO: 207 K/UL
POTASSIUM SERPL-SCNC: 5.7 MMOL/L
PROT SERPL-MCNC: 7 G/DL
RBC # BLD: 5.2 M/UL
RBC # FLD: 13.1 %
SODIUM SERPL-SCNC: 129 MMOL/L
WBC # FLD AUTO: 5.74 K/UL

## 2023-12-14 LAB
ANION GAP SERPL CALC-SCNC: 16 MMOL/L
BUN SERPL-MCNC: 17 MG/DL
CALCIUM SERPL-MCNC: 8.9 MG/DL
CHLORIDE SERPL-SCNC: 96 MMOL/L
CO2 SERPL-SCNC: 22 MMOL/L
CORTIS SERPL-MCNC: 9.1 UG/DL
CREAT SERPL-MCNC: 0.79 MG/DL
EGFR: 96 ML/MIN/1.73M2
GLUCOSE SERPL-MCNC: 206 MG/DL
OSMOLALITY SERPL: 285 MOSMOL/KG
OSMOLALITY UR: 675 MOSM/KG
POTASSIUM SERPL-SCNC: 4.5 MMOL/L
SODIUM ?TM SUB UR QN: 61 MMOL/L
SODIUM SERPL-SCNC: 134 MMOL/L

## 2024-01-31 ENCOUNTER — APPOINTMENT (OUTPATIENT)
Dept: ENDOCRINOLOGY | Facility: CLINIC | Age: 69
End: 2024-01-31

## 2024-02-06 ENCOUNTER — APPOINTMENT (OUTPATIENT)
Dept: GERIATRICS | Facility: CLINIC | Age: 69
End: 2024-02-06
Payer: MEDICARE

## 2024-02-06 VITALS
SYSTOLIC BLOOD PRESSURE: 170 MMHG | DIASTOLIC BLOOD PRESSURE: 74 MMHG | WEIGHT: 196.13 LBS | HEART RATE: 76 BPM | HEIGHT: 68 IN | TEMPERATURE: 97.2 F | BODY MASS INDEX: 29.72 KG/M2 | OXYGEN SATURATION: 97 %

## 2024-02-06 VITALS — SYSTOLIC BLOOD PRESSURE: 145 MMHG | DIASTOLIC BLOOD PRESSURE: 70 MMHG

## 2024-02-06 DIAGNOSIS — Z23 ENCOUNTER FOR IMMUNIZATION: ICD-10-CM

## 2024-02-06 DIAGNOSIS — R10.9 UNSPECIFIED ABDOMINAL PAIN: ICD-10-CM

## 2024-02-06 DIAGNOSIS — R37 SEXUAL DYSFUNCTION, UNSPECIFIED: ICD-10-CM

## 2024-02-06 PROCEDURE — 99215 OFFICE O/P EST HI 40 MIN: CPT | Mod: GC

## 2024-02-06 RX ORDER — EMPAGLIFLOZIN 25 MG/1
25 TABLET, FILM COATED ORAL
Qty: 90 | Refills: 0 | Status: DISCONTINUED | COMMUNITY
Start: 2022-07-24 | End: 2024-02-06

## 2024-02-07 NOTE — END OF VISIT
[] : Fellow [FreeTextEntry3] : Agree with the [place] assessment and plan. I have reviewed and edited the note above as needed. In addition to the management outlined above, briefly: 69y/o M with PMH of IDDM, HTN, HLD, sexual dysfunction (lack of desire and premature ejaculation) presented for regular follow up. A/P 1. Sexual Dysfxn- pending urology 2nd opinion 2.DMT2- restart metformin 500 mg for 1 week. If no GI issues can inc to 500 BID 3. HTN- counseled to avoid skipping hydralazine for /80. Can 1/2 dose and monitor BP  4. Abdominal Pain- improved with liliana 100 mg OD.  [Time Spent: ___ minutes] : I have spent [unfilled] minutes of time on the encounter.

## 2024-02-07 NOTE — HISTORY OF PRESENT ILLNESS
[Patient reported hearing was normal] : Patient reported hearing was normal [Patient reported vision is abnormal] : Patient reported vision is abnormal [No falls in past year] : Patient reported no falls in the past year [Completely Independent] : Completely independent. [FreeTextEntry1] : The pt 67y/o M with PMH of IDDM, HTN, HLD, sexual dysfunction (lack of desire and premature ejaculation) presented for regular follow up. Mentions multiple issues:  1. Sexual dysfunction (lack of desire and premature ejaculation) - previously was following with a urology who diagnosed to have lack of blood flow to genitals and recommended Viagra but pt did not take it due to concern for low BP as he is on hydralazine - Went to endocrine who referred to a new urology for further evaluation - next urology appt in March 2024 -willing to wait to get 2nd opinion before doing any further intervention    2. DM - A1C 9.3 in 09/23; following with endocrine - currently taking basal-bolus insulin regimen (Lantus to 24 units SQ daily and increase Humalog to 8 units QAC with 2/50 correction if above 150) - in last time suggested he might need more insulin but mentioned will fu with his endocrine;  this AM - sees ophthalmology; podiatry referral given last time but did not follow - previously could not tolerate metformin- unsure about the cause; willing to try again this time to see if he can tolerate  - SGLT made him pee a lot   3. Abdominal pain: - has h/o chronic upper abdominal pain (Cannot characterize the quality exactly) - had extensive evaluation with multiple GI Doctors with CT, MRI, EGD, colonoscopy where it showed CBD and Pancreatic duct dilation - currently following with GI DR Chris Marie who did MRI abd recently with normal findings - recently started taking gabapentin at bedtime, which is help him with pain, taking Dex lansoprazole and GasX PRN - suggested to sign release of request form today in office to get GI records   4. HTN - Taking BP regularly at home; around 145/72 - taking Coreg BID regularly - not compliant with Hydralazine; was suggested to take BID; he takes AM dose regularly but if he sees night BP around 140 SBP, he does not take the night dose   Vaccines - UTD with flu, PNA, shingles    [TextBox_37] : last seen ophthalmology 6 months   [TextBox_19] : did colonoscopy recently; suggested to send the records

## 2024-02-07 NOTE — REVIEW OF SYSTEMS
[Abdominal Pain] : abdominal pain [Negative] : Respiratory [FreeTextEntry2] : Sexual dysfunction  [FreeTextEntry7] : occasional abd pain which improved a lot

## 2024-02-08 LAB
ALBUMIN SERPL ELPH-MCNC: 4.3 G/DL
ALP BLD-CCNC: 58 U/L
ALT SERPL-CCNC: 17 U/L
ANION GAP SERPL CALC-SCNC: 10 MMOL/L
AST SERPL-CCNC: 17 U/L
BILIRUB SERPL-MCNC: 0.3 MG/DL
BUN SERPL-MCNC: 17 MG/DL
CALCIUM SERPL-MCNC: 9.6 MG/DL
CHLORIDE SERPL-SCNC: 98 MMOL/L
CO2 SERPL-SCNC: 26 MMOL/L
CREAT SERPL-MCNC: 0.78 MG/DL
EGFR: 96 ML/MIN/1.73M2
ESTIMATED AVERAGE GLUCOSE: 246 MG/DL
GLUCOSE SERPL-MCNC: 155 MG/DL
HBA1C MFR BLD HPLC: 10.2 %
POTASSIUM SERPL-SCNC: 4.4 MMOL/L
PROT SERPL-MCNC: 6.6 G/DL
SODIUM SERPL-SCNC: 134 MMOL/L

## 2024-02-12 ENCOUNTER — APPOINTMENT (OUTPATIENT)
Dept: ENDOCRINOLOGY | Facility: CLINIC | Age: 69
End: 2024-02-12
Payer: MEDICARE

## 2024-02-12 VITALS
HEART RATE: 69 BPM | OXYGEN SATURATION: 95 % | DIASTOLIC BLOOD PRESSURE: 68 MMHG | WEIGHT: 198 LBS | SYSTOLIC BLOOD PRESSURE: 160 MMHG | BODY MASS INDEX: 30.11 KG/M2

## 2024-02-12 VITALS — SYSTOLIC BLOOD PRESSURE: 150 MMHG | DIASTOLIC BLOOD PRESSURE: 70 MMHG

## 2024-02-12 DIAGNOSIS — E78.5 HYPERLIPIDEMIA, UNSPECIFIED: ICD-10-CM

## 2024-02-12 DIAGNOSIS — E11.8 TYPE 2 DIABETES MELLITUS WITH UNSPECIFIED COMPLICATIONS: ICD-10-CM

## 2024-02-12 PROCEDURE — 99215 OFFICE O/P EST HI 40 MIN: CPT

## 2024-02-12 PROCEDURE — 95251 CONT GLUC MNTR ANALYSIS I&R: CPT

## 2024-02-12 RX ORDER — PEN NEEDLE, DIABETIC 32GX 5/32"
33G X 5 MM NEEDLE, DISPOSABLE MISCELLANEOUS
Qty: 400 | Refills: 3 | Status: ACTIVE | COMMUNITY
Start: 2021-11-20 | End: 1900-01-01

## 2024-02-12 RX ORDER — INSULIN LISPRO 100 [IU]/ML
100 INJECTION, SOLUTION INTRAVENOUS; SUBCUTANEOUS
Qty: 2 | Refills: 3 | Status: ACTIVE | COMMUNITY
Start: 2023-05-12 | End: 1900-01-01

## 2024-02-12 NOTE — REVIEW OF SYSTEMS
[Dry Eyes] : dryness [Constipation] : constipation [Heartburn] : heartburn [Polyuria] : polyuria [Erectile Dysfunction] : erectile dysfunction [Pain/Numbness of Digits] : pain/numbness of digits [Negative] : Integumentary [Fatigue] : no fatigue [Recent Weight Gain (___ Lbs)] : no recent weight gain [Recent Weight Loss (___ Lbs)] : no recent weight loss [Polydipsia] : no polydipsia [Cold Intolerance] : no cold intolerance [Heat Intolerance] : no heat intolerance [Swelling] : no swelling [FreeTextEntry3] : decrease  in night vision

## 2024-02-12 NOTE — HISTORY OF PRESENT ILLNESS
[Continuous Glucose Monitoring] : Continuous Glucose Monitoring: Yes [Paty] : Paty [FreeTextEntry1] : 68 y.o. male with Type 2 DM diagnosed in 1998 presents for follow up visit.   Stopped taking glipizide ER 10 mg daily and Jardiance 25 mg daily (because too much urination). Now taking Humalog 8 units QAC with 2/50 correction scale above 150 and Basaglar/Lantus 24 units SQ daily. He reports not taking insulin consistently because worried about lows. Unable to afford GLP-1 agonist.  Started Metformin 500 mg 1 to 2 times per day by his PCP and tolerating it for now.   He used to take glyburide/metformin and reports abdominal pain with metformin so stopped it. He used to take Januvia but stopped it because it was not helping.   Does have retinopathy and UTD with ophthalmology. Not aware of kidney disease. Does report left toe numbness following ankle injury. Does have a podiatrist but does not see regularly. Reports premature ejaculation and ED. PCP checked free and total testosterone levels. Did see urology.    Did see cardiology and denies heart disease.   He is using Freestyle Paty 2 now.   Diet: Breakfast: croissant or whole wheat toast with eggs with coffee Lunch: vegetables with chicken or fish or steak and plantain or chicken salad Dinner: rice with protein Drinks: water Snacks: cookies  Less outdoor walking now because of cold weather.  Overall feeling well. Reports polyuria but no polydipsia. No SOB or CP. Reports less constipation and GERD. Did see GI.  Sees cardiology (last visit on 12/8/22) Dr. Lopez Deras in [place] [phone]. Stopped Losartan secondary to hyperkalemia.  [FreeTextEntry2] : 6 [FreeTextEntry3] : 94 [FreeTextEntry4] : 0 [de-identified] : 10.4%

## 2024-02-12 NOTE — ASSESSMENT
[Diabetes Foot Care] : diabetes foot care [Long Term Vascular Complications] : long term vascular complications of diabetes [Carbohydrate Consistent Diet] : carbohydrate consistent diet [Importance of Diet and Exercise] : importance of diet and exercise to improve glycemic control, achieve weight loss and improve cardiovascular health [FreeTextEntry1] : 67 y.o. male with h/o Type 2 DM with retinopathy, HTN and hyperlipidemia.  1. Type 2 DM- Poor control with Hba1c of 10.2% this month. Discussed pathophysiology. Reviewed blood glucose and Hba1c goals. Encouraged a carbohydrate consistent diet and exercise. Encouraged patient to meet with our RD. Freestyle Paty download reviewed today with 6% in range which shows postprandial hyperglycemia. Will continue Lantus 24 units SQ daily and Humalog 8 units QAC with 2/50 correction if above 150 given noncompliance. Reviewed proper basal bolus administration. Will remain off Jardiance given significant increase in urination which is intolerable to patient. Unable to afford GLP-1 agonist. Intolerant to metformin in the past but able to tolerate 500 mg daily. UTD with CMP and urine alb/cr ratio done in Feburay 2024. Encouraged patient to follow up in the office in 2 weeks so we can review Paty data and adjust insulin regimen if needed.   2. HTN- BP is above goal. Will continue current medications; however ARB (Losartan) stopped given hyperkalemia. Will follow up with PCP.   3. Hyperlipidemia- Lipids are at goal. Encouraged low fat diet. Will continue Pravastatin 40 mg daily.  4. Sexual dysfunction- We discussed recent testosterone levels and suspect patient has normal testosterone level. Free testosterone assay is less accurate and total testosterone level is normal. Recommend urologic follow up.    Follow up in 3 to 4 months.

## 2024-02-23 ENCOUNTER — APPOINTMENT (OUTPATIENT)
Dept: ULTRASOUND IMAGING | Facility: CLINIC | Age: 69
End: 2024-02-23
Payer: MEDICARE

## 2024-02-23 ENCOUNTER — OUTPATIENT (OUTPATIENT)
Dept: OUTPATIENT SERVICES | Facility: HOSPITAL | Age: 69
LOS: 1 days | End: 2024-02-23
Payer: MEDICARE

## 2024-02-23 DIAGNOSIS — Z12.9 ENCOUNTER FOR SCREENING FOR MALIGNANT NEOPLASM, SITE UNSPECIFIED: ICD-10-CM

## 2024-02-23 PROCEDURE — 76700 US EXAM ABDOM COMPLETE: CPT

## 2024-02-23 PROCEDURE — 76700 US EXAM ABDOM COMPLETE: CPT | Mod: 26

## 2024-03-05 ENCOUNTER — APPOINTMENT (OUTPATIENT)
Dept: UROLOGY | Facility: CLINIC | Age: 69
End: 2024-03-05
Payer: MEDICARE

## 2024-03-05 VITALS
HEART RATE: 71 BPM | WEIGHT: 192 LBS | SYSTOLIC BLOOD PRESSURE: 114 MMHG | HEIGHT: 68 IN | BODY MASS INDEX: 29.1 KG/M2 | DIASTOLIC BLOOD PRESSURE: 69 MMHG

## 2024-03-05 PROCEDURE — G2211 COMPLEX E/M VISIT ADD ON: CPT

## 2024-03-05 PROCEDURE — 99204 OFFICE O/P NEW MOD 45 MIN: CPT

## 2024-03-05 NOTE — LETTER BODY
[FreeTextEntry1] : Dear ,  Thank you for referring your patient Michelle Wilkinson for consultation for erectile dysfunction.  I have requested several baseline blood studies. I will see the patient back in followup shortly and make further recommendations. I have attached a copy of my consultation note for your records.  Thank you again for this kind referral. I will certainly keep you updated with further progress. Please do not hesitate to call me if you have any questions.  Best regards,    Jr Garcia M.D., PhD Professor of Urology    [facility] [employer] of \[facility] [place] [facility]\""/[facility]  for , Reproductive and Sexual Medicine    [facility]

## 2024-03-05 NOTE — HISTORY OF PRESENT ILLNESS
[FreeTextEntry1] : Patient is a 68-year-old gentleman with diabetes and hypertension who presents with a chief complaint of erectile dysfunction. He states that this has been present for the past 3 years. It causes both he and his partner great stress.  I reviewed the questionnaire he completed in detail. His erections are not modified with the degree of sexual stimulation.   He states that his erections presently are often less than 0 out of 10 in both tumescence and rigidity, insufficient for penetration.   He often ejaculates through a flaccid phallus.  He has difficulty maintaining an erection. He describes a normal libido.  His sexual dysfunction occurs with both sexual relations and masturbation.  His erections are not improved with PDE5 inhibitors (Cialis).    His partner is understanding and was unable to be with him at the visit today. He is not  and has adult children.    The patient denies fevers, chills, nausea and/or vomiting and no unexplained weight loss.  His past medical history is non-contributory.  In his present occupation he has no known toxin exposure. He does not smoke and drinks socially.  He has no known drug allergies. His review of systems and past medical history demonstrates no significant urologic issues (see patient completed questionnaire). His family history demonstrates no significant urologic issues. A chaperone was offered to be present for the examination but declined by the patient.

## 2024-03-05 NOTE — ASSESSMENT
[FreeTextEntry1] : This pleasant gentleman presents for evaluation of his sexual dysfunction.  I have requested several blood studies and a urine analysis.  I will make further recommendations when the results return. I have suggested a diagnostic injection and duplex ultrasound to evaluate his penile vasculature. We discussed his evaluation today and possible options for therapy depending upon the results of his testing.  I will be better able to make more specific recommendations after additional test results return.  1. Review blood tests on follow up 2. Penile duplex study on follow up  Consultation: 45 minutes reviewing his history and discussing prior results, discussing various treatment options, writing  requests for lab testing and writing his note. There was also additional time in preparing for the visit.

## 2024-03-07 LAB
25(OH)D3 SERPL-MCNC: 30.3 NG/ML
ALBUMIN SERPL ELPH-MCNC: 4.3 G/DL
ALP BLD-CCNC: 57 U/L
ALT SERPL-CCNC: 14 U/L
ANION GAP SERPL CALC-SCNC: 15 MMOL/L
APO A-I SERPL-MCNC: 142 MG/DL
APO A-I/APO B SERPL: 0.55 RATIO
APO B SERPL-MCNC: 78 MG/DL
APPEARANCE: CLEAR
AST SERPL-CCNC: 14 U/L
BILIRUB SERPL-MCNC: 0.5 MG/DL
BILIRUBIN URINE: NEGATIVE
BLOOD URINE: NEGATIVE
BUN SERPL-MCNC: 18 MG/DL
CALCIUM SERPL-MCNC: 9.7 MG/DL
CHLORIDE SERPL-SCNC: 94 MMOL/L
CHOLEST SERPL-MCNC: 153 MG/DL
CO2 SERPL-SCNC: 25 MMOL/L
COLOR: YELLOW
CREAT SERPL-MCNC: 0.91 MG/DL
CRP SERPL HS-MCNC: 1.15 MG/L
EGFR: 91 ML/MIN/1.73M2
ESTIMATED AVERAGE GLUCOSE: 240 MG/DL
ESTRADIOL SERPL-MCNC: 23 PG/ML
GLUCOSE QUALITATIVE U: >=1000 MG/DL
GLUCOSE SERPL-MCNC: 214 MG/DL
HBA1C MFR BLD HPLC: 10 %
HCT VFR BLD CALC: 47.2 %
HDLC SERPL-MCNC: 52 MG/DL
HGB BLD-MCNC: 14.3 G/DL
KETONES URINE: ABNORMAL MG/DL
LDLC SERPL CALC-MCNC: 90 MG/DL
LEUKOCYTE ESTERASE URINE: NEGATIVE
LH SERPL-ACNC: 5.2 IU/L
MCHC RBC-ENTMCNC: 27.4 PG
MCHC RBC-ENTMCNC: 30.3 GM/DL
MCV RBC AUTO: 90.4 FL
NITRITE URINE: NEGATIVE
NONHDLC SERPL-MCNC: 101 MG/DL
PH URINE: 7.5
PLATELET # BLD AUTO: 226 K/UL
POTASSIUM SERPL-SCNC: 5.2 MMOL/L
PROLACTIN SERPL-MCNC: 12.4 NG/ML
PROT SERPL-MCNC: 6.8 G/DL
PROTEIN URINE: NEGATIVE MG/DL
PSA SERPL-MCNC: 1.61 NG/ML
RBC # BLD: 5.22 M/UL
RBC # FLD: 13.1 %
SODIUM SERPL-SCNC: 133 MMOL/L
SPECIFIC GRAVITY URINE: >1.03
TRIGL SERPL-MCNC: 52 MG/DL
TSH SERPL-ACNC: 0.88 UIU/ML
UROBILINOGEN URINE: 0.2 MG/DL
WBC # FLD AUTO: 6.04 K/UL

## 2024-03-13 LAB — TESTOST SERPL-MCNC: 585 NG/DL

## 2024-04-13 NOTE — PHYSICAL EXAM
[General Appearance - Well Developed] : well developed [General Appearance - Well Nourished] : well nourished [Normal Appearance] : normal appearance [Well Groomed] : well groomed [General Appearance - In No Acute Distress] : no acute distress [Heart Rate And Rhythm] : heart rate and rhythm were normal [Edema] : no peripheral edema [Arterial Pulses Normal] : the pedal pulses were normal [Respiration, Rhythm And Depth] : normal respiratory rhythm and effort [Exaggerated Use Of Accessory Muscles For Inspiration] : no accessory muscle use [Auscultation Breath Sounds / Voice Sounds] : lungs were clear to auscultation bilaterally [Chest Palpation] : palpation of the chest revealed no abnormalities [Lungs Percussion] : the lungs were normal to percussion [Bowel Sounds] : normal bowel sounds [Abdomen Soft] : soft [Abdomen Tenderness] : non-tender [Abdomen Mass (___ Cm)] : no abdominal mass palpated [Abdomen Hernia] : no hernia was discovered [Costovertebral Angle Tenderness] : no ~M costovertebral angle tenderness [Urethral Meatus] : meatus normal [Penis Abnormality] : normal circumcised penis [Urinary Bladder Findings] : the bladder was normal on palpation [Scrotum] : the scrotum was normal [Rectal Exam - Seminal Vesicles] : the seminal vesicles were normal [Epididymis] : the epididymides were normal [Testes Tenderness] : no tenderness of the testes [Testes Mass (___cm)] : there were no testicular masses [Anus Abnormality] : the anus and perineum were normal [Rectal Exam - Rectum] : digital rectal exam was normal [Prostate Enlargement] : the prostate was not enlarged [Prostate Tenderness] : the prostate was not tender [Normal Station and Gait] : the gait and station were normal for the patient's age [Skin Color & Pigmentation] : normal skin color and pigmentation [Skin Turgor] : supple [] : no rash [Skin Lesions] : no skin lesions [No Focal Deficits] : no focal deficits [Sensation] : the sensory exam was normal to light touch and pinprick [Motor Exam] : the motor exam was normal [Oriented To Time, Place, And Person] : oriented to person, place, and time [Mood] : the mood was normal [Affect] : the affect was normal [Not Anxious] : not anxious [No Palpable Adenopathy] : no palpable adenopathy [Cervical Lymph Nodes Enlarged Posterior Bilaterally] : posterior cervical [Cervical Lymph Nodes Enlarged Anterior Bilaterally] : anterior cervical [Supraclavicular Lymph Nodes Enlarged Bilaterally] : supraclavicular [Axillary Lymph Nodes Enlarged Bilaterally] : axillary [Femoral Lymph Nodes Enlarged Bilaterally] : femoral [Inguinal Lymph Nodes Enlarged Bilaterally] : inguinal

## 2024-04-16 ENCOUNTER — APPOINTMENT (OUTPATIENT)
Dept: UROLOGY | Facility: CLINIC | Age: 69
End: 2024-04-16
Payer: MEDICARE

## 2024-04-16 ENCOUNTER — OUTPATIENT (OUTPATIENT)
Dept: OUTPATIENT SERVICES | Facility: HOSPITAL | Age: 69
LOS: 1 days | End: 2024-04-16
Payer: MEDICARE

## 2024-04-16 DIAGNOSIS — R35.0 FREQUENCY OF MICTURITION: ICD-10-CM

## 2024-04-16 PROCEDURE — 54235 NJX CORPORA CAVERNOSA RX AGT: CPT

## 2024-04-16 PROCEDURE — G2211 COMPLEX E/M VISIT ADD ON: CPT

## 2024-04-16 PROCEDURE — 93980 PENILE VASCULAR STUDY: CPT | Mod: 26

## 2024-04-16 PROCEDURE — 99214 OFFICE O/P EST MOD 30 MIN: CPT | Mod: 25

## 2024-04-16 PROCEDURE — 93980 PENILE VASCULAR STUDY: CPT

## 2024-04-16 RX ORDER — FLASH GLUCOSE SENSOR
KIT MISCELLANEOUS
Qty: 6 | Refills: 3 | Status: ACTIVE | COMMUNITY
Start: 2024-04-16 | End: 1900-01-01

## 2024-04-16 NOTE — ASSESSMENT
[FreeTextEntry1] : The patient returns for follow-up to review his recent blood studies and discuss options for therapy. Laboratory studies dated March 5, 2024 demonstrated a urinalysis with a specific gravity greater than 1.030 glucose greater than 1000 mg/dL trace ketones.  His PSA was 1.61 ng/mL, estradiol 23 pg/mL LH 5.2 IU/L, prolactin 12.4 ng/mL.  TSH was 0.88 IU/mL, vitamin D 25 was 30.3 ng/mL.  His hemoglobin A1c was 10% and he is under endocrine care.  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 Medical evaluation was recommended.  He will return for injection training at 0.6 cc of the Trimix.  Consultation: 35 minutes reviewing his history and discussing prior results, discussing various treatment options, writing medication prescriptions, requests for lab testing and writing his note. There was also additional time in preparing for the visit.

## 2024-04-16 NOTE — HISTORY OF PRESENT ILLNESS
[FreeTextEntry1] : The patient returns for follow-up to review his recent blood studies and discuss options for therapy.  PMH: Patient is a 68-year-old gentleman with diabetes and hypertension who presents with a chief complaint of erectile dysfunction. He states that this has been present for the past 3 years. It causes both he and his partner great stress.  I reviewed the questionnaire he completed in detail. His erections are not modified with the degree of sexual stimulation.   He states that his erections presently are often less than 0 out of 10 in both tumescence and rigidity, insufficient for penetration.   He often ejaculates through a flaccid phallus.  He has difficulty maintaining an erection. He describes a normal libido.  His sexual dysfunction occurs with both sexual relations and masturbation.  His erections are not improved with PDE5 inhibitors (Cialis).    His partner is understanding and was unable to be with him at the visit today. He is not  and has adult children.    The patient denies fevers, chills, nausea and/or vomiting and no unexplained weight loss.  His past medical history is non-contributory.  In his present occupation he has no known toxin exposure. He does not smoke and drinks socially.  He has no known drug allergies. His review of systems and past medical history demonstrates no significant urologic issues (see patient completed questionnaire). His family history demonstrates no significant urologic issues. A chaperone was offered to be present for the examination but declined by the patient.

## 2024-04-17 DIAGNOSIS — E11.9 TYPE 2 DIABETES MELLITUS WITHOUT COMPLICATIONS: ICD-10-CM

## 2024-04-17 DIAGNOSIS — N52.9 MALE ERECTILE DYSFUNCTION, UNSPECIFIED: ICD-10-CM

## 2024-04-25 ENCOUNTER — APPOINTMENT (OUTPATIENT)
Dept: GERIATRICS | Facility: CLINIC | Age: 69
End: 2024-04-25

## 2024-05-07 ENCOUNTER — APPOINTMENT (OUTPATIENT)
Dept: GERIATRICS | Facility: CLINIC | Age: 69
End: 2024-05-07
Payer: MEDICARE

## 2024-05-07 VITALS
OXYGEN SATURATION: 97 % | WEIGHT: 195 LBS | BODY MASS INDEX: 29.55 KG/M2 | SYSTOLIC BLOOD PRESSURE: 177 MMHG | TEMPERATURE: 97.3 F | HEART RATE: 70 BPM | DIASTOLIC BLOOD PRESSURE: 81 MMHG | HEIGHT: 68 IN

## 2024-05-07 VITALS — SYSTOLIC BLOOD PRESSURE: 150 MMHG | DIASTOLIC BLOOD PRESSURE: 80 MMHG

## 2024-05-07 VITALS — SYSTOLIC BLOOD PRESSURE: 160 MMHG | DIASTOLIC BLOOD PRESSURE: 76 MMHG

## 2024-05-07 VITALS — HEART RATE: 78 BPM | DIASTOLIC BLOOD PRESSURE: 80 MMHG | SYSTOLIC BLOOD PRESSURE: 150 MMHG

## 2024-05-07 DIAGNOSIS — E11.9 TYPE 2 DIABETES MELLITUS W/OUT COMPLICATIONS: ICD-10-CM

## 2024-05-07 DIAGNOSIS — I10 ESSENTIAL (PRIMARY) HYPERTENSION: ICD-10-CM

## 2024-05-07 DIAGNOSIS — N52.9 MALE ERECTILE DYSFUNCTION, UNSPECIFIED: ICD-10-CM

## 2024-05-07 DIAGNOSIS — Z00.00 ENCOUNTER FOR GENERAL ADULT MEDICAL EXAMINATION W/OUT ABNORMAL FINDINGS: ICD-10-CM

## 2024-05-07 PROCEDURE — 99215 OFFICE O/P EST HI 40 MIN: CPT | Mod: GC

## 2024-05-07 RX ORDER — METFORMIN HYDROCHLORIDE 500 MG/1
500 TABLET, COATED ORAL
Qty: 90 | Refills: 3 | Status: DISCONTINUED | COMMUNITY
Start: 2024-02-06 | End: 2024-05-07

## 2024-05-13 PROBLEM — E11.9 DIABETES MELLITUS: Status: ACTIVE | Noted: 2023-10-24

## 2024-05-13 PROBLEM — I10 BENIGN ESSENTIAL HYPERTENSION: Status: ACTIVE | Noted: 2022-08-17

## 2024-05-13 PROBLEM — N52.9 MALE ERECTILE DISORDER OF ORGANIC ORIGIN: Status: ACTIVE | Noted: 2024-03-02

## 2024-05-16 ENCOUNTER — APPOINTMENT (OUTPATIENT)
Dept: UROLOGY | Facility: CLINIC | Age: 69
End: 2024-05-16

## 2024-05-21 NOTE — END OF VISIT
[] : Fellow [FreeTextEntry3] : Agree with the [place] assessment and plan. Patient has noncompliance with meds which largely effect his DM correction and libido. Pending endo apt for medication re-eval. Did not tolerate metformin. Encourage use of BP medication.  [Time Spent: ___ minutes] : I have spent [unfilled] minutes of time on the encounter.

## 2024-05-21 NOTE — PHYSICAL EXAM
[Normal] : normal bowel sounds, non-tender [Edema] : edema was not present [No Spinal Tenderness] : no spinal tenderness [Involuntary Movements] : no involuntary movements were seen [Normal Color / Pigmentation] : normal skin color and pigmentation [No Focal Deficits] : no focal deficits [Normal Affect] : the affect was normal [Normal Insight/Judgment] : insight and judgment were intact [Normal Mood] : the mood was normal

## 2024-05-21 NOTE — HISTORY OF PRESENT ILLNESS
[Patient reported hearing was normal] : Patient reported hearing was normal [No falls in past year] : Patient reported no falls in the past year [Completely Independent] : Completely independent. [FreeTextEntry1] : The pt 67y/o M with PMH of IDDM, HTN, HLD, sexual dysfunction (lack of desire and premature ejaculation) presented for regular follow up. Mentions multiple issues:  1. Sexual dysfunction (lack of desire and premature ejaculation) - previously was following with a urology who diagnosed to have lack of blood flow to genitals and recommended Viagra but pt did not take it due to concern for low BP as he is on hydralazine - Went to endocrine who referred to a new urology for further evaluation - seen a new urology Dr. Jr Garcia who diagnosed arteriogenic dysfunction. Recommended Trimix injection (Received 1 on 04/16/24) but did not help the pt; pt has next appt with urology in August 2024    2. DM - A1C 10 in 03/24; following with endocrine - currently taking basal-bolus insulin regimen (Lantus to 24 units SQ daily and increase Humalog to 8 units QAC with 2/50 correction if above 150) - previously could not tolerate metformin- unsure about the cause; gave another trial of metformin but could not tolerate due to GI upset  - sees ophthalmology; podiatry referral given last time but did not follow - SGLT made him pee a lot   3. HTN - Taking BP regularly at home; around 150/80 - taking Coreg BID regularly - not compliant with Hydralazine; was suggested to take BID; he takes AM dose regularly but if he sees night BP around 60 DBP, he does not take the night dose  Vaccines - UTD with flu, PNA, shingles [TextBox_37] : sees an opthalmology  [0] : 2) Feeling down, depressed, or hopeless: Not at all (0)

## 2024-06-20 ENCOUNTER — APPOINTMENT (OUTPATIENT)
Dept: UROLOGY | Facility: CLINIC | Age: 69
End: 2024-06-20
Payer: MEDICARE

## 2024-06-20 VITALS
DIASTOLIC BLOOD PRESSURE: 73 MMHG | OXYGEN SATURATION: 97 % | WEIGHT: 197 LBS | HEART RATE: 62 BPM | BODY MASS INDEX: 29.86 KG/M2 | RESPIRATION RATE: 16 BRPM | HEIGHT: 68 IN | SYSTOLIC BLOOD PRESSURE: 143 MMHG

## 2024-06-20 PROCEDURE — 99214 OFFICE O/P EST MOD 30 MIN: CPT

## 2024-06-20 PROCEDURE — G2211 COMPLEX E/M VISIT ADD ON: CPT

## 2024-06-20 RX ORDER — PAPAVERINE HYDROCHLORIDE 30 MG/ML
30 INJECTION, SOLUTION INTRAVENOUS
Qty: 5 | Refills: 2 | Status: ACTIVE | COMMUNITY
Start: 2024-03-05 | End: 1900-01-01

## 2024-06-20 NOTE — ASSESSMENT
[FreeTextEntry1] : The patient returns for penile injection training.  Laboratory studies dated March 5, 2024 demonstrated a urinalysis with a specific gravity greater than 1.030 glucose greater than 1000 mg/dL trace ketones.  His PSA was 1.61 ng/mL, estradiol 23 pg/mL LH 5.2 IU/L, prolactin 12.4 ng/mL.  TSH was 0.88 IU/mL, vitamin D 25 was 30.3 ng/mL.  His hemoglobin A1c was 10% and he is under endocrine care.  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 Medical evaluation was recommended.    Patient returned for his first penile injection training.  He stated that he has not been able to obtain an erection sufficient for penetration with PDE5 inhibitors.  He was not interested in using a vacuum constriction device at this time. He wanted to try penile injection therapy.  We reviewed the procedure with the relative risks and benefits. A copy of the informed consent is on file. I assisted him in injecting 0.6 cc of the TriMix into the right corpora. At 30 minutes he had 70% tumescence and 50% rigidity. His erection dissipated prior to his leaving the office.  We discussed the use of a pharmacologic agent in the diagnostic evaluation of organic and in special instances psychogenic erectile dysfunction.  He was made fully aware that several medications used may not be approved by the [employer]AL [employer] for this purpose, although they may be well recognized and accepted by the American Urologic Association as a treatment and diagnostic tool for impotence.  He understands that there still remains a need to standardize the indications, contraindications and dosage parameters for the testing as well as treatment purposes of this procedure. He understands that the TriMix is a compounded medication and that there are other, FDA approved, injectable medications available for  use.   He received a brochure on injection therapy and I have taken particular care in reviewing carefully all potential complications of this procedure and made him  fully aware that even a death has been reported in conjunction with this therapy.  Never-the-less, weighing all risks and benefits that this procedure affords, he was willing to proceed with the use of a intracavernosal injection of the pharmacological agent prescribed and either compounded by a pharmaceutical company, pharmacist or by Dr. Garcia as part of the diagnostic evaluation and/or use of this pharmacologic agent as a treatment for  erectile dysfunction. He was made aware that a prolonged erection (priapism) might result from penile injections and that it must be treated within four hours to prevent damage to the erectile mechanism of his penis. He acknowledged that he must notify Dr. Garcia (or the covering doctor) and have this condition treated within fours hours should it occur. He had the opportunity to ask any questions all of which were answered to his satisfaction.   He will call with any concerns or questions. I will see him back in follow up prior to dispensing additional medication.  He will be injecting 0.4 to  0.6 cc of the higher dose Trimix.  Consultation: 35 minutes reviewing his history and performing a physical examination. Reviewing the procedure for penile injection therapy, writing for prescription medications ,discussing treatment options and writing his note. There was also additional time in preparation for today's visit.

## 2024-06-20 NOTE — PHYSICAL EXAM
[General Appearance - Well Developed] : well developed [General Appearance - Well Nourished] : well nourished [Normal Appearance] : normal appearance [Well Groomed] : well groomed [General Appearance - In No Acute Distress] : no acute distress [Heart Rate And Rhythm] : heart rate and rhythm were normal [Edema] : no peripheral edema [Arterial Pulses Normal] : the pedal pulses were normal [Respiration, Rhythm And Depth] : normal respiratory rhythm and effort [Exaggerated Use Of Accessory Muscles For Inspiration] : no accessory muscle use [Auscultation Breath Sounds / Voice Sounds] : lungs were clear to auscultation bilaterally [Chest Palpation] : palpation of the chest revealed no abnormalities [Lungs Percussion] : the lungs were normal to percussion [Bowel Sounds] : normal bowel sounds [Abdomen Soft] : soft [Abdomen Tenderness] : non-tender [Abdomen Mass (___ Cm)] : no abdominal mass palpated [Costovertebral Angle Tenderness] : no ~M costovertebral angle tenderness [Abdomen Hernia] : no hernia was discovered [Urethral Meatus] : meatus normal [Penis Abnormality] : normal circumcised penis [Urinary Bladder Findings] : the bladder was normal on palpation [Scrotum] : the scrotum was normal [Rectal Exam - Seminal Vesicles] : the seminal vesicles were normal [Epididymis] : the epididymides were normal [Testes Tenderness] : no tenderness of the testes [Testes Mass (___cm)] : there were no testicular masses [Anus Abnormality] : the anus and perineum were normal [Rectal Exam - Rectum] : digital rectal exam was normal [Prostate Enlargement] : the prostate was not enlarged [Prostate Tenderness] : the prostate was not tender [Normal Station and Gait] : the gait and station were normal for the patient's age [Skin Color & Pigmentation] : normal skin color and pigmentation [Skin Turgor] : supple [] : no rash [Skin Lesions] : no skin lesions [No Focal Deficits] : no focal deficits [Sensation] : the sensory exam was normal to light touch and pinprick [Motor Exam] : the motor exam was normal [Oriented To Time, Place, And Person] : oriented to person, place, and time [Affect] : the affect was normal [Mood] : the mood was normal [Not Anxious] : not anxious [No Palpable Adenopathy] : no palpable adenopathy [Cervical Lymph Nodes Enlarged Posterior Bilaterally] : posterior cervical [Cervical Lymph Nodes Enlarged Anterior Bilaterally] : anterior cervical [Axillary Lymph Nodes Enlarged Bilaterally] : axillary [Supraclavicular Lymph Nodes Enlarged Bilaterally] : supraclavicular [Femoral Lymph Nodes Enlarged Bilaterally] : femoral [Inguinal Lymph Nodes Enlarged Bilaterally] : inguinal

## 2024-06-20 NOTE — HISTORY OF PRESENT ILLNESS
[FreeTextEntry1] : The patient returns for penile injection training.  PMH: Patient is a 68-year-old gentleman with diabetes and hypertension who presents with a chief complaint of erectile dysfunction. He states that this has been present for the past 3 years. It causes both he and his partner great stress.  I reviewed the questionnaire he completed in detail. His erections are not modified with the degree of sexual stimulation.   He states that his erections presently are often less than 0 out of 10 in both tumescence and rigidity, insufficient for penetration.   He often ejaculates through a flaccid phallus.  He has difficulty maintaining an erection. He describes a normal libido.  His sexual dysfunction occurs with both sexual relations and masturbation.  His erections are not improved with PDE5 inhibitors (Cialis).    His partner is understanding and was unable to be with him at the visit today. He is not  and has adult children.    The patient denies fevers, chills, nausea and/or vomiting and no unexplained weight loss.  His past medical history is non-contributory.  In his present occupation he has no known toxin exposure. He does not smoke and drinks socially.  He has no known drug allergies. His review of systems and past medical history demonstrates no significant urologic issues (see patient completed questionnaire). His family history demonstrates no significant urologic issues. A chaperone was offered to be present for the examination but declined by the patient.

## 2024-06-24 ENCOUNTER — RX RENEWAL (OUTPATIENT)
Age: 69
End: 2024-06-24

## 2024-06-24 RX ORDER — INSULIN GLARGINE 100 [IU]/ML
100 INJECTION, SOLUTION SUBCUTANEOUS
Qty: 15 | Refills: 3 | Status: ACTIVE | COMMUNITY
Start: 2023-05-23 | End: 1900-01-01

## 2024-07-08 ENCOUNTER — APPOINTMENT (OUTPATIENT)
Dept: ENDOCRINOLOGY | Facility: CLINIC | Age: 69
End: 2024-07-08
Payer: MEDICARE

## 2024-07-08 VITALS
DIASTOLIC BLOOD PRESSURE: 70 MMHG | BODY MASS INDEX: 29.86 KG/M2 | SYSTOLIC BLOOD PRESSURE: 131 MMHG | HEIGHT: 68 IN | OXYGEN SATURATION: 93 % | WEIGHT: 197 LBS | HEART RATE: 73 BPM

## 2024-07-08 DIAGNOSIS — E11.8 TYPE 2 DIABETES MELLITUS WITH UNSPECIFIED COMPLICATIONS: ICD-10-CM

## 2024-07-08 DIAGNOSIS — E78.5 HYPERLIPIDEMIA, UNSPECIFIED: ICD-10-CM

## 2024-07-08 DIAGNOSIS — I10 ESSENTIAL (PRIMARY) HYPERTENSION: ICD-10-CM

## 2024-07-08 DIAGNOSIS — R37 SEXUAL DYSFUNCTION, UNSPECIFIED: ICD-10-CM

## 2024-07-08 LAB — HBA1C MFR BLD HPLC: 10.9

## 2024-07-08 PROCEDURE — 99215 OFFICE O/P EST HI 40 MIN: CPT

## 2024-07-08 PROCEDURE — 83036 HEMOGLOBIN GLYCOSYLATED A1C: CPT | Mod: QW

## 2024-07-08 PROCEDURE — 95251 CONT GLUC MNTR ANALYSIS I&R: CPT

## 2024-07-09 LAB
CREAT SPEC-SCNC: 191 MG/DL
MICROALBUMIN 24H UR DL<=1MG/L-MCNC: 2.8 MG/DL
MICROALBUMIN/CREAT 24H UR-RTO: 14 MG/G

## 2024-07-25 ENCOUNTER — NON-APPOINTMENT (OUTPATIENT)
Age: 69
End: 2024-07-25

## 2024-08-01 ENCOUNTER — APPOINTMENT (OUTPATIENT)
Dept: UROLOGY | Facility: CLINIC | Age: 69
End: 2024-08-01
Payer: MEDICARE

## 2024-08-01 DIAGNOSIS — R37 SEXUAL DYSFUNCTION, UNSPECIFIED: ICD-10-CM

## 2024-08-01 PROCEDURE — 99214 OFFICE O/P EST MOD 30 MIN: CPT

## 2024-08-01 PROCEDURE — G2211 COMPLEX E/M VISIT ADD ON: CPT

## 2024-08-01 NOTE — ASSESSMENT
[FreeTextEntry1] : The patient returns for follow-up.  He has been injecting 0.4 cc of the Trimix. He is having difficulty. He would like to return for in-person training.   Laboratory studies dated March 5, 2024 demonstrated a urinalysis with a specific gravity greater than 1.030 glucose greater than 1000 mg/dL trace ketones.  His PSA was 1.61 ng/mL, estradiol 23 pg/mL LH 5.2 IU/L, prolactin 12.4 ng/mL.  TSH was 0.88 IU/mL, vitamin D 25 was 30.3 ng/mL.  His hemoglobin A1c was 10% and he is under endocrine care.  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 Medical evaluation was recommended.    I will see him back shortly for penile injection training. I have suggested  injecting 0.4 to  0.6 cc of the higher dose Trimix.  Telehealth Consultation: 35 minutes reviewing his history and discussing prior results, discussing various treatment options, revieiwing penile injection procedure, requests for lab testing and writing his note. There was also additional time in preparing for the visit and assisting the patient with technology issues he was having with the telehealth platform.

## 2024-08-01 NOTE — HISTORY OF PRESENT ILLNESS
[FreeTextEntry1] : The patient-doctor. relationship has been established in a face-to-face fashion on real-time video audio HIPAA compliant communication using telemedicine software. The patient,Michelle Kwan was at home and participated in the telephonic visit with the Physician Jr Garcia MD PhD who was in his medical office. The patient's identity has been confirmed.  The patient was previously emailed a copy of the telemedicine consent.  The patient has had a chance to review and has now given verbal consent and has requested care to be assessed and treated through telemedicine. The patient understands there may be limitations in this process and that they need not need further follow-up care in the office and/or hospital settings.   Verbal consent was given on Tuesday, July 30, 2024 at 9 AM by the patient.  It was requested by the physician.  A written consent was previously sent for the patient to sign and return.  The patient returns for follow-up.  He has been injecting 0.4 to 0.6 cc of the Trimix.  PMH: Patient initially presented with diabetes and hypertension who presents with a chief complaint of erectile dysfunction. He states that this has been present for the past 3 years. It causes both he and his partner great stress.  I reviewed the questionnaire he completed in detail. His erections are not modified with the degree of sexual stimulation.   He states that his erections presently are often less than 0 out of 10 in both tumescence and rigidity, insufficient for penetration.   He often ejaculates through a flaccid phallus.  He has difficulty maintaining an erection. He describes a normal libido.  His sexual dysfunction occurs with both sexual relations and masturbation.  His erections are not improved with PDE5 inhibitors (Cialis).    His partner is understanding and was unable to be with him at the visit today. He is not  and has adult children.    The patient denies fevers, chills, nausea and/or vomiting and no unexplained weight loss.  His past medical history is non-contributory.  In his present occupation he has no known toxin exposure. He does not smoke and drinks socially.  He has no known drug allergies. His review of systems and past medical history demonstrates no significant urologic issues (see patient completed questionnaire). His family history demonstrates no significant urologic issues. A chaperone was offered to be present for the examination but declined by the patient.

## 2024-08-13 ENCOUNTER — APPOINTMENT (OUTPATIENT)
Dept: UROLOGY | Facility: CLINIC | Age: 69
End: 2024-08-13
Payer: MEDICARE

## 2024-08-13 ENCOUNTER — APPOINTMENT (OUTPATIENT)
Dept: GERIATRICS | Facility: CLINIC | Age: 69
End: 2024-08-13
Payer: MEDICARE

## 2024-08-13 VITALS
OXYGEN SATURATION: 98 % | SYSTOLIC BLOOD PRESSURE: 183 MMHG | HEART RATE: 73 BPM | HEIGHT: 68 IN | BODY MASS INDEX: 30.08 KG/M2 | TEMPERATURE: 97 F | WEIGHT: 198.5 LBS | DIASTOLIC BLOOD PRESSURE: 89 MMHG

## 2024-08-13 VITALS
DIASTOLIC BLOOD PRESSURE: 69 MMHG | WEIGHT: 201 LBS | SYSTOLIC BLOOD PRESSURE: 146 MMHG | BODY MASS INDEX: 30.46 KG/M2 | HEIGHT: 68 IN | HEART RATE: 75 BPM

## 2024-08-13 DIAGNOSIS — R37 SEXUAL DYSFUNCTION, UNSPECIFIED: ICD-10-CM

## 2024-08-13 DIAGNOSIS — E87.1 HYPO-OSMOLALITY AND HYPONATREMIA: ICD-10-CM

## 2024-08-13 DIAGNOSIS — E11.9 TYPE 2 DIABETES MELLITUS W/OUT COMPLICATIONS: ICD-10-CM

## 2024-08-13 DIAGNOSIS — I10 ESSENTIAL (PRIMARY) HYPERTENSION: ICD-10-CM

## 2024-08-13 PROCEDURE — G2211 COMPLEX E/M VISIT ADD ON: CPT

## 2024-08-13 PROCEDURE — 99214 OFFICE O/P EST MOD 30 MIN: CPT | Mod: GC

## 2024-08-13 PROCEDURE — 99214 OFFICE O/P EST MOD 30 MIN: CPT

## 2024-08-13 NOTE — ASSESSMENT
[FreeTextEntry1] : The patient returns for follow-up.  He has been injecting 0.4 cc of the Trimix. He is having difficulty. He would like to return for in-person training.  Laboratory studies dated March 5, 2024 demonstrated a urinalysis with a specific gravity greater than 1.030 glucose greater than 1000 mg/dL trace ketones.  His PSA was 1.61 ng/mL, estradiol 23 pg/mL LH 5.2 IU/L, prolactin 12.4 ng/mL.  TSH was 0.88 IU/mL, vitamin D 25 was 30.3 ng/mL.  His hemoglobin A1c was 10% and he is under endocrine care.  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 Medical evaluation was recommended.    Patient returned for his secod penile injection training.  He stated that he has not been able to obtain an erection sufficient for penetration with PDE5 inhibitors.  He was not interested in using a vacuum constriction device at this time. He wanted to try penile injection therapy.  We reviewed the procedure with the relative risks and benefits. A copy of the informed consent is on file. I assisted him in injecting 0.6 cc of the TriMix into the right corpora. At 30 minutes he had 90% tumescence and 70% rigidity. His erection dissipated prior to his leaving the office.  We discussed the use of a pharmacologic agent in the diagnostic evaluation of organic and in special instances psychogenic erectile dysfunction.  He was made fully aware that several medications used may not be approved by the [employer] for this purpose, although they may be well recognized and accepted by the American [employer] Association as a treatment and diagnostic tool for impotence.  He understands that there still remains a need to standardize the indications, contraindications and dosage parameters for the testing as well as treatment purposes of this procedure. He understands that the TriMix is a compounded medication and that there are other, FDA approved, injectable medications available for  use.   He received a brochure on injection therapy and I have taken particular care in reviewing carefully all potential complications of this procedure and made him  fully aware that even a death has been reported in conjunction with this therapy.  Never-the-less, weighing all risks and benefits that this procedure affords, he was willing to proceed with the use of a intracavernosal injection of the pharmacological agent prescribed and either compounded by a pharmaceutical company, pharmacist or by Dr. Garcia as part of the diagnostic evaluation and/or use of this pharmacologic agent as a treatment for  erectile dysfunction. He was made aware that a prolonged erection (priapism) might result from penile injections and that it must be treated within four hours to prevent damage to the erectile mechanism of his penis. He acknowledged that he must notify Dr. Garcia (or the covering doctor) and have this condition treated within fours hours should it occur. He had the opportunity to ask any questions all of which were answered to his satisfaction.   He will call with any concerns or questions. I will see him back in follow up prior to dispensing additional medication.  Consultation: 35 minutes reviewing his history and performing a physical examination. Reviewing the procedure for penile injection therapy, writing for prescription medications ,discussing treatment options and writing his note. There was also additional time in preparation for today's visit.

## 2024-08-13 NOTE — PHYSICAL EXAM
[Alert] : alert [No Acute Distress] : in no acute distress [Sclera] : the sclera and conjunctiva were normal [Normal Appearance] : the appearance of the neck was normal [No Respiratory Distress] : no respiratory distress [Respiration, Rhythm And Depth] : normal respiratory rhythm and effort [Normal S1, S2] : normal S1 and S2 [Heart Rate And Rhythm] : heart rate was normal and rhythm regular [Bowel Sounds] : normal bowel sounds [Abdomen Soft] : soft [Normal Gait] : normal gait [No Focal Deficits] : no focal deficits [Normal Affect] : the affect was normal [Normal Mood] : the mood was normal

## 2024-08-13 NOTE — HISTORY OF PRESENT ILLNESS
[FreeTextEntry1] : The patient returns for follow-up.  He has been injecting 0.4 to 0.6 cc of the Trimix.  PMH: Patient initially presented with diabetes and hypertension who presents with a chief complaint of erectile dysfunction. He states that this has been present for the past 3 years. It causes both he and his partner great stress.  I reviewed the questionnaire he completed in detail. His erections are not modified with the degree of sexual stimulation.   He states that his erections presently are often less than 0 out of 10 in both tumescence and rigidity, insufficient for penetration.   He often ejaculates through a flaccid phallus.  He has difficulty maintaining an erection. He describes a normal libido.  His sexual dysfunction occurs with both sexual relations and masturbation.  His erections are not improved with PDE5 inhibitors (Cialis).    His partner is understanding and was unable to be with him at the visit today. He is not  and has adult children.    The patient denies fevers, chills, nausea and/or vomiting and no unexplained weight loss.  His past medical history is non-contributory.  In his present occupation he has no known toxin exposure. He does not smoke and drinks socially.  He has no known drug allergies. His review of systems and past medical history demonstrates no significant urologic issues (see patient completed questionnaire). His family history demonstrates no significant urologic issues. A chaperone was offered to be present for the examination but declined by the patient.

## 2024-08-14 NOTE — END OF VISIT
[] : Fellow [FreeTextEntry3] : In addition to the management outlined above, briefly: 68-year-old male with past medical history of diabetes type 2, hypertension, hyperlipidemia, sexual dysfunction comes for follow-up.  Hypertension-patient stated he did not take any of his medications because he was late for the appointment.  Counseled on importance of taking medications on time.  At this time patient does not have any symptoms of chest pain, headache, dizziness, visual disturbances.  Will call back with home blood pressure reading at 11 AM tomorrow.  DMT2-patient stated compliance to medication.  Counseling provided that many of the symptoms patient is experiencing including sexual dysfunction may be improved with tighter A1c control.  Will follow-up A1c-next endocrine appointment in October.  Of note urology appointment today for counseling of Trimix injections for sexual dysfunction. [Time Spent: ___ minutes] : I have spent [unfilled] minutes of time on the encounter.

## 2024-08-14 NOTE — HISTORY OF PRESENT ILLNESS
[Patient reported hearing was normal] : Patient reported hearing was normal [No falls in past year] : Patient reported no falls in the past year [Completely Independent] : Completely independent. [0] : 2) Feeling down, depressed, or hopeless: Not at all (0) [Smoke Detector] : smoke detector [Carbon Monoxide Detector] : carbon monoxide detector [Wears Seat Belt] : wears seat belt [FreeTextEntry1] : The pt 69 y/o M with PMH of IDDM, HTN, HLD, sexual dysfunction (lack of desire and premature ejaculation) presented for regular follow up. Mentions multiple issues:  1. Sexual dysfunction (lack of desire and premature ejaculation) - previously was following with a urology who diagnosed to have lack of blood flow to genitals and recommended Viagra but pt did not take it due to concern for low BP as he is on hydralazine - Went to endocrine who referred to a new urology for further evaluation - seen a new urology Dr. Jr Garcia who diagnosed arteriogenic dysfunction. Recommended Trimix injection, patient reports not much improvement as he is not sure if he is injecting correctly. He has a follow up appt today with Urology.    2. DM - A1C 10.9 in 07/24; following with endocrine - currently taking basal-bolus insulin regimen (Lantus to 28 units SQ daily and increase Humalog to 10, 14, 16, based off SS) - previously could not tolerate metformin- unsure about the cause; gave another trial of metformin but could not tolerate due to GI upset  - sees ophthalmology; podiatry referral however he missed his appointment  - SGLT made him pee a lot   3. HTN - Taking BP regularly at home; around 150/80 - Uncontrolled today in clinic, patient states he did not take his medication  - On Coreg 25 bid and hydralazine 100 bid, questionable compliance   Vaccines - UTD with flu, PNA, shingles [TextBox_37] : sees an opthalmology  [Night Light] : no night light [Driving Concerns] : not driving or driving without noted concerns

## 2024-08-14 NOTE — HISTORY OF PRESENT ILLNESS
[Patient reported hearing was normal] : Patient reported hearing was normal [No falls in past year] : Patient reported no falls in the past year [Completely Independent] : Completely independent. [0] : 2) Feeling down, depressed, or hopeless: Not at all (0) [Smoke Detector] : smoke detector [Carbon Monoxide Detector] : carbon monoxide detector [Wears Seat Belt] : wears seat belt [FreeTextEntry1] : The pt 67 y/o M with PMH of IDDM, HTN, HLD, sexual dysfunction (lack of desire and premature ejaculation) presented for regular follow up. Mentions multiple issues:  1. Sexual dysfunction (lack of desire and premature ejaculation) - previously was following with a urology who diagnosed to have lack of blood flow to genitals and recommended Viagra but pt did not take it due to concern for low BP as he is on hydralazine - Went to endocrine who referred to a new urology for further evaluation - seen a new urology Dr. Jr Garcia who diagnosed arteriogenic dysfunction. Recommended Trimix injection, patient reports not much improvement as he is not sure if he is injecting correctly. He has a follow up appt today with Urology.    2. DM - A1C 10.9 in 07/24; following with endocrine - currently taking basal-bolus insulin regimen (Lantus to 28 units SQ daily and increase Humalog to 10, 14, 16, based off SS) - previously could not tolerate metformin- unsure about the cause; gave another trial of metformin but could not tolerate due to GI upset  - sees ophthalmology; podiatry referral however he missed his appointment  - SGLT made him pee a lot   3. HTN - Taking BP regularly at home; around 150/80 - Uncontrolled today in clinic, patient states he did not take his medication  - On Coreg 25 bid and hydralazine 100 bid, questionable compliance   Vaccines - UTD with flu, PNA, shingles [TextBox_37] : sees an opthalmology  [Night Light] : no night light [Driving Concerns] : not driving or driving without noted concerns

## 2024-08-15 LAB
ALBUMIN SERPL ELPH-MCNC: 3.8 G/DL
ALP BLD-CCNC: 60 U/L
ALT SERPL-CCNC: 16 U/L
ANION GAP SERPL CALC-SCNC: 9 MMOL/L
AST SERPL-CCNC: 21 U/L
BILIRUB SERPL-MCNC: 0.4 MG/DL
BUN SERPL-MCNC: 15 MG/DL
CALCIUM SERPL-MCNC: 9.6 MG/DL
CHLORIDE SERPL-SCNC: 99 MMOL/L
CO2 SERPL-SCNC: 25 MMOL/L
CREAT SERPL-MCNC: 0.92 MG/DL
EGFR: 89 ML/MIN/1.73M2
ESTIMATED AVERAGE GLUCOSE: 229 MG/DL
GLUCOSE SERPL-MCNC: 176 MG/DL
HBA1C MFR BLD HPLC: 9.6 %
POTASSIUM SERPL-SCNC: 5 MMOL/L
PROT SERPL-MCNC: 6.6 G/DL
SODIUM SERPL-SCNC: 133 MMOL/L

## 2024-10-01 ENCOUNTER — APPOINTMENT (OUTPATIENT)
Dept: UROLOGY | Facility: CLINIC | Age: 69
End: 2024-10-01
Payer: MEDICARE

## 2024-10-01 PROCEDURE — 99214 OFFICE O/P EST MOD 30 MIN: CPT

## 2024-10-01 PROCEDURE — G2211 COMPLEX E/M VISIT ADD ON: CPT

## 2024-10-01 NOTE — ASSESSMENT
Carrington is a 48 year old who is being evaluated via a billable video visit.      How would you like to obtain your AVS? MyChart  If the video visit is dropped, the invitation should be resent by: Text to cell phone: [phone]  Will anyone else be joining your video visit? No    Assessment & Plan   Problem List Items Addressed This Visit    None  Visit Diagnoses     Acute sinusitis with symptoms > 10 days    -  Primary    Relevant Medications    amoxicillin (AMOXIL) 500 MG capsule    Pain, dental             Amoxicillin 500 mg twice a day   Mucinex Dm 1 tablet twice a day     Lower teeth pain is not sinus related-patient was advised to take Ibuprofen and if not better see dentists       14 minutes spent by me on the date of the encounter doing chart review, history and exam, documentation and further activities per the note           Shani Arellano PA-C  [facility]    Roderick Shultz is a 48 year old, presenting for the following health issues:  Facial Pain    HPI     Pain History:  When did you first notice your pain? 10 days   Have you seen anyone else for your pain? Yes - [facility] doctor - given prednisone for 5 days which he has since completed  How has your pain affected your ability to work? 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 Facial pain, jaw/dental pain in bottom molar on L side    Of Note: Notes feels like sinus infection. Tylenol & Ibuprofen with some relief. Called dentist who noted they wanted pt to follow up with primary care first.    Acute Illness  Acute illness concerns: uri and sinus pain   Onset/Duration: 10 days   Symptoms:  Fever: No  Chills/Sweats: No  Headache (location?): No  Sinus Pressure: YES-left maxillary region  Conjunctivitis:  No  Ear Pain: no  Rhinorrhea: YES  Congestion: YES  Sore Throat: YES  Cough: YES-productive of yellow  [FreeTextEntry1] : The patient returns for follow-up.  He has been injecting 0.4 cc of the Trimix. He is having difficulty. He would like to return for in-person training.  Laboratory studies dated March 5, 2024 demonstrated a urinalysis with a specific gravity greater than 1.030 glucose greater than 1000 mg/dL trace ketones.  His PSA was 1.61 ng/mL, estradiol 23 pg/mL LH 5.2 IU/L, prolactin 12.4 ng/mL.  TSH was 0.88 IU/mL, vitamin D 25 was 30.3 ng/mL.  His hemoglobin A1c was 10% and he is under endocrine care.  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 Medical evaluation was recommended.    We discussed the procedure of penile injection therapy with its relative risks and benefits as well as the proper technique.  He will be continuing with injection of 0.6 cc of the high dose Trimix.  I have written for a higher dosage of the medication and will have him return for a second injection training session.  We discussed the addition of a vacuum constriction device for use of a PDE 5 inhibitor.  Telehealth Consultation: 35 minutes reviewing his history and discussing prior results, discussing various treatment options, writing medication prescriptions, reviewing injection technique and writing his note. There was also additional time in preparing for the visit and assisting the patient with technology issues he was having with the telehealth platform.  sputum  Wheeze: No  Decreased Appetite: No  Nausea: No  Vomiting: No  Diarrhea: No  Dysuria/Freq.: No  Dysuria or Hematuria: No  Fatigue/Achiness: YES  Sick/Strep Exposure: YES- kid is sick  Therapies tried and outcome: prednisone -helped sinus pain    Patient has lower left molar pain with chewing for 3 days    Review of Systems   Constitutional, HEENT, cardiovascular, pulmonary, gi and gu systems are negative, except as otherwise noted.      Objective           Vitals:  No vitals were obtained today due to virtual visit.    Physical Exam   GENERAL: Healthy, alert and no distress  EYES: Eyes grossly normal to inspection.  No discharge or erythema, or obvious scleral/conjunctival abnormalities.  RESP: No audible wheeze, cough, or visible cyanosis.  No visible retractions or increased work of breathing.    SKIN: Visible skin clear. No significant rash, abnormal pigmentation or lesions.  NEURO: Cranial nerves grossly intact.  Mentation and speech appropriate for age.  PSYCH: Mentation appears normal, affect normal/bright, judgement and insight intact, normal speech and appearance well-groomed.                Video-Visit Details    Type of service:  Video Visit     Originating Location (pt. Location): Home  Distant Location (provider location):  Off-site  Platform used for Video Visit: Eugenio

## 2024-10-01 NOTE — HISTORY OF PRESENT ILLNESS
[FreeTextEntry1] : The patient-doctor. relationship has been established in a face-to-face fashion on real-time video audio HIPAA compliant communication using telemedicine software. The patient, Michelle Kwan was at home and participated in the telephonic visit with the Physician Jr Garcia MD PhD who was in his medical office. The patient's identity has been confirmed.  The patient was previously emailed a copy of the telemedicine consent.  The patient has had a chance to review and has now given verbal consent and has requested care to be assessed and treated through telemedicine. The patient understands there may be limitations in this process and that they need not need further follow-up care in the office and/or hospital settings.   Verbal consent was given on Tuesday, October 1, 2024 at 9 AM by the patient.  It was requested by the physician.  A written consent was previously sent for the patient to sign and return.  The patient returns for follow-up.  He has been injecting 0.4 to 0.6 cc of the Trimix.  He states he has had some trouble with the injections.   PMH: Patient initially presented with diabetes and hypertension who presents with a chief complaint of erectile dysfunction. He states that this has been present for the past 3 years. It causes both he and his partner great stress.  I reviewed the questionnaire he completed in detail. His erections are not modified with the degree of sexual stimulation.   He states that his erections presently are often less than 0 out of 10 in both tumescence and rigidity, insufficient for penetration.   He often ejaculates through a flaccid phallus.  He has difficulty maintaining an erection. He describes a normal libido.  His sexual dysfunction occurs with both sexual relations and masturbation.  His erections are not improved with PDE5 inhibitors (Cialis).    His partner is understanding and was unable to be with him at the visit today. He is not  and has adult children.    The patient denies fevers, chills, nausea and/or vomiting and no unexplained weight loss.  His past medical history is non-contributory.  In his present occupation he has no known toxin exposure. He does not smoke and drinks socially.  He has no known drug allergies. His review of systems and past medical history demonstrates no significant urologic issues (see patient completed questionnaire). His family history demonstrates no significant urologic issues. A chaperone was offered to be present for the examination but declined by the patient.

## 2024-10-01 NOTE — ASSESSMENT
[FreeTextEntry1] : The patient returns for follow-up.  He has been injecting 0.4 cc of the Trimix. He is having difficulty. He would like to return for in-person training.  Laboratory studies dated March 5, 2024 demonstrated a urinalysis with a specific gravity greater than 1.030 glucose greater than 1000 mg/dL trace ketones.  His PSA was 1.61 ng/mL, estradiol 23 pg/mL LH 5.2 IU/L, prolactin 12.4 ng/mL.  TSH was 0.88 IU/mL, vitamin D 25 was 30.3 ng/mL.  His hemoglobin A1c was 10% and he is under endocrine care.  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 Medical evaluation was recommended.    We discussed the procedure of penile injection therapy with its relative risks and benefits as well as the proper technique.  He will be continuing with injection of 0.6 cc of the high dose Trimix.  I have written for a higher dosage of the medication and will have him return for a second injection training session.  We discussed the addition of a vacuum constriction device for use of a PDE 5 inhibitor.  Telehealth Consultation: 35 minutes reviewing his history and discussing prior results, discussing various treatment options, writing medication prescriptions, reviewing injection technique and writing his note. There was also additional time in preparing for the visit and assisting the patient with technology issues he was having with the telehealth platform.

## 2024-10-17 ENCOUNTER — APPOINTMENT (OUTPATIENT)
Dept: UROLOGY | Facility: CLINIC | Age: 69
End: 2024-10-17
Payer: MEDICARE

## 2024-10-17 ENCOUNTER — NON-APPOINTMENT (OUTPATIENT)
Age: 69
End: 2024-10-17

## 2024-10-17 VITALS
HEART RATE: 73 BPM | DIASTOLIC BLOOD PRESSURE: 70 MMHG | WEIGHT: 199 LBS | HEIGHT: 68 IN | BODY MASS INDEX: 30.16 KG/M2 | SYSTOLIC BLOOD PRESSURE: 165 MMHG

## 2024-10-17 DIAGNOSIS — E11.9 TYPE 2 DIABETES MELLITUS W/OUT COMPLICATIONS: ICD-10-CM

## 2024-10-17 DIAGNOSIS — N52.9 MALE ERECTILE DYSFUNCTION, UNSPECIFIED: ICD-10-CM

## 2024-10-17 PROCEDURE — G2211 COMPLEX E/M VISIT ADD ON: CPT

## 2024-10-17 PROCEDURE — 99214 OFFICE O/P EST MOD 30 MIN: CPT

## 2024-10-25 ENCOUNTER — APPOINTMENT (OUTPATIENT)
Dept: ENDOCRINOLOGY | Facility: CLINIC | Age: 69
End: 2024-10-25

## 2024-11-12 ENCOUNTER — APPOINTMENT (OUTPATIENT)
Dept: GERIATRICS | Facility: CLINIC | Age: 69
End: 2024-11-12
Payer: MEDICARE

## 2024-11-12 VITALS
DIASTOLIC BLOOD PRESSURE: 92 MMHG | HEART RATE: 73 BPM | SYSTOLIC BLOOD PRESSURE: 167 MMHG | RESPIRATION RATE: 16 BRPM | WEIGHT: 200.38 LBS | HEIGHT: 68 IN | OXYGEN SATURATION: 97 % | TEMPERATURE: 97.2 F | BODY MASS INDEX: 30.37 KG/M2

## 2024-11-12 DIAGNOSIS — E11.9 TYPE 2 DIABETES MELLITUS W/OUT COMPLICATIONS: ICD-10-CM

## 2024-11-12 DIAGNOSIS — M54.2 CERVICALGIA: ICD-10-CM

## 2024-11-12 DIAGNOSIS — I10 ESSENTIAL (PRIMARY) HYPERTENSION: ICD-10-CM

## 2024-11-12 DIAGNOSIS — R37 SEXUAL DYSFUNCTION, UNSPECIFIED: ICD-10-CM

## 2024-11-12 PROCEDURE — 99214 OFFICE O/P EST MOD 30 MIN: CPT | Mod: GC

## 2024-11-12 PROCEDURE — G2211 COMPLEX E/M VISIT ADD ON: CPT

## 2024-11-12 RX ORDER — PANTOPRAZOLE 40 MG/1
40 TABLET, DELAYED RELEASE ORAL DAILY
Refills: 0 | Status: ACTIVE | COMMUNITY
Start: 2024-11-12

## 2024-11-17 LAB
ALDOSTERONE SERUM: 10.7 NG/DL
ANION GAP SERPL CALC-SCNC: 9 MMOL/L
BASOPHILS # BLD AUTO: 0.05 K/UL
BASOPHILS NFR BLD AUTO: 0.7 %
BUN SERPL-MCNC: 14 MG/DL
CALCIUM SERPL-MCNC: 9.3 MG/DL
CHLORIDE SERPL-SCNC: 97 MMOL/L
CHOLEST SERPL-MCNC: 150 MG/DL
CO2 SERPL-SCNC: 25 MMOL/L
CREAT SERPL-MCNC: 0.72 MG/DL
EGFR: 98 ML/MIN/1.73M2
EOSINOPHIL # BLD AUTO: 0.14 K/UL
EOSINOPHIL NFR BLD AUTO: 2 %
ESTIMATED AVERAGE GLUCOSE: 255 MG/DL
GLUCOSE SERPL-MCNC: 178 MG/DL
HBA1C MFR BLD HPLC: 10.5 %
HCT VFR BLD CALC: 45.3 %
HDLC SERPL-MCNC: 51 MG/DL
HGB BLD-MCNC: 14.3 G/DL
IMM GRANULOCYTES NFR BLD AUTO: 0.3 %
LDLC SERPL CALC-MCNC: 90 MG/DL
LYMPHOCYTES # BLD AUTO: 2.42 K/UL
LYMPHOCYTES NFR BLD AUTO: 34.2 %
MAN DIFF?: NORMAL
MCHC RBC-ENTMCNC: 27.8 PG
MCHC RBC-ENTMCNC: 31.6 G/DL
MCV RBC AUTO: 88.1 FL
MONOCYTES # BLD AUTO: 0.61 K/UL
MONOCYTES NFR BLD AUTO: 8.6 %
NEUTROPHILS # BLD AUTO: 3.83 K/UL
NEUTROPHILS NFR BLD AUTO: 54.2 %
NONHDLC SERPL-MCNC: 99 MG/DL
PLATELET # BLD AUTO: 214 K/UL
POTASSIUM SERPL-SCNC: 4.3 MMOL/L
RBC # BLD: 5.14 M/UL
RBC # FLD: 13.3 %
SODIUM SERPL-SCNC: 132 MMOL/L
TRIGL SERPL-MCNC: 43 MG/DL
WBC # FLD AUTO: 7.07 K/UL

## 2024-12-09 ENCOUNTER — APPOINTMENT (OUTPATIENT)
Dept: ENDOCRINOLOGY | Facility: CLINIC | Age: 69
End: 2024-12-09
Payer: MEDICARE

## 2024-12-09 VITALS
SYSTOLIC BLOOD PRESSURE: 150 MMHG | DIASTOLIC BLOOD PRESSURE: 85 MMHG | HEIGHT: 68 IN | HEART RATE: 75 BPM | WEIGHT: 202 LBS | BODY MASS INDEX: 30.62 KG/M2 | OXYGEN SATURATION: 99 %

## 2024-12-09 VITALS — DIASTOLIC BLOOD PRESSURE: 70 MMHG | SYSTOLIC BLOOD PRESSURE: 140 MMHG

## 2024-12-09 DIAGNOSIS — I10 ESSENTIAL (PRIMARY) HYPERTENSION: ICD-10-CM

## 2024-12-09 DIAGNOSIS — E11.8 TYPE 2 DIABETES MELLITUS WITH UNSPECIFIED COMPLICATIONS: ICD-10-CM

## 2024-12-09 DIAGNOSIS — E78.5 HYPERLIPIDEMIA, UNSPECIFIED: ICD-10-CM

## 2024-12-09 PROCEDURE — 99214 OFFICE O/P EST MOD 30 MIN: CPT

## 2024-12-09 PROCEDURE — 95251 CONT GLUC MNTR ANALYSIS I&R: CPT

## 2024-12-09 PROCEDURE — G2211 COMPLEX E/M VISIT ADD ON: CPT

## 2024-12-17 ENCOUNTER — APPOINTMENT (OUTPATIENT)
Dept: UROLOGY | Facility: CLINIC | Age: 69
End: 2024-12-17
Payer: MEDICARE

## 2024-12-17 PROCEDURE — G2211 COMPLEX E/M VISIT ADD ON: CPT

## 2024-12-17 PROCEDURE — 99214 OFFICE O/P EST MOD 30 MIN: CPT

## 2025-01-31 ENCOUNTER — APPOINTMENT (OUTPATIENT)
Dept: UROLOGY | Facility: CLINIC | Age: 70
End: 2025-01-31
Payer: MEDICARE

## 2025-01-31 PROCEDURE — G2211 COMPLEX E/M VISIT ADD ON: CPT

## 2025-01-31 PROCEDURE — 99213 OFFICE O/P EST LOW 20 MIN: CPT

## 2025-02-07 ENCOUNTER — APPOINTMENT (OUTPATIENT)
Dept: UROLOGY | Facility: CLINIC | Age: 70
End: 2025-02-07
Payer: MEDICARE

## 2025-02-07 VITALS
OXYGEN SATURATION: 97 % | TEMPERATURE: 97.3 F | RESPIRATION RATE: 16 BRPM | HEART RATE: 78 BPM | SYSTOLIC BLOOD PRESSURE: 136 MMHG | WEIGHT: 202 LBS | DIASTOLIC BLOOD PRESSURE: 64 MMHG | HEIGHT: 68 IN | BODY MASS INDEX: 30.62 KG/M2

## 2025-02-07 DIAGNOSIS — N52.9 MALE ERECTILE DYSFUNCTION, UNSPECIFIED: ICD-10-CM

## 2025-02-07 DIAGNOSIS — N40.1 BENIGN PROSTATIC HYPERPLASIA WITH LOWER URINARY TRACT SYMPMS: ICD-10-CM

## 2025-02-07 PROCEDURE — G2211 COMPLEX E/M VISIT ADD ON: CPT

## 2025-02-07 PROCEDURE — 99213 OFFICE O/P EST LOW 20 MIN: CPT

## 2025-02-10 ENCOUNTER — RX RENEWAL (OUTPATIENT)
Age: 70
End: 2025-02-10

## 2025-02-10 LAB
APPEARANCE: CLEAR
BILIRUBIN URINE: NEGATIVE
BLOOD URINE: NEGATIVE
COLOR: YELLOW
GLUCOSE QUALITATIVE U: >=1000 MG/DL
KETONES URINE: NEGATIVE MG/DL
LEUKOCYTE ESTERASE URINE: NEGATIVE
NITRITE URINE: NEGATIVE
PH URINE: 7.5
PROTEIN URINE: NORMAL MG/DL
SPECIFIC GRAVITY URINE: 1.02
UROBILINOGEN URINE: 0.2 MG/DL

## 2025-02-18 ENCOUNTER — APPOINTMENT (OUTPATIENT)
Dept: GERIATRICS | Facility: CLINIC | Age: 70
End: 2025-02-18

## 2025-02-21 ENCOUNTER — APPOINTMENT (OUTPATIENT)
Dept: UROLOGY | Facility: CLINIC | Age: 70
End: 2025-02-21
Payer: MEDICARE

## 2025-02-21 VITALS
RESPIRATION RATE: 17 BRPM | WEIGHT: 201 LBS | DIASTOLIC BLOOD PRESSURE: 74 MMHG | HEART RATE: 73 BPM | BODY MASS INDEX: 30.46 KG/M2 | HEIGHT: 68 IN | OXYGEN SATURATION: 96 % | SYSTOLIC BLOOD PRESSURE: 136 MMHG

## 2025-02-21 DIAGNOSIS — N40.1 BENIGN PROSTATIC HYPERPLASIA WITH LOWER URINARY TRACT SYMPMS: ICD-10-CM

## 2025-02-21 DIAGNOSIS — N52.9 MALE ERECTILE DYSFUNCTION, UNSPECIFIED: ICD-10-CM

## 2025-02-21 PROCEDURE — G2211 COMPLEX E/M VISIT ADD ON: CPT

## 2025-02-21 PROCEDURE — 99213 OFFICE O/P EST LOW 20 MIN: CPT

## 2025-02-21 RX ORDER — TADALAFIL 5 MG/1
5 TABLET ORAL
Qty: 90 | Refills: 3 | Status: ACTIVE | COMMUNITY
Start: 2025-02-21 | End: 1900-01-01

## 2025-02-25 ENCOUNTER — APPOINTMENT (OUTPATIENT)
Dept: GERIATRICS | Facility: CLINIC | Age: 70
End: 2025-02-25
Payer: MEDICARE

## 2025-02-25 ENCOUNTER — MED ADMIN CHARGE (OUTPATIENT)
Age: 70
End: 2025-02-25

## 2025-02-25 ENCOUNTER — RX RENEWAL (OUTPATIENT)
Age: 70
End: 2025-02-25

## 2025-02-25 VITALS
SYSTOLIC BLOOD PRESSURE: 133 MMHG | HEART RATE: 80 BPM | TEMPERATURE: 97.9 F | RESPIRATION RATE: 16 BRPM | HEIGHT: 68 IN | DIASTOLIC BLOOD PRESSURE: 73 MMHG | BODY MASS INDEX: 30.35 KG/M2 | OXYGEN SATURATION: 96 % | WEIGHT: 200.25 LBS

## 2025-02-25 DIAGNOSIS — E11.8 TYPE 2 DIABETES MELLITUS WITH UNSPECIFIED COMPLICATIONS: ICD-10-CM

## 2025-02-25 DIAGNOSIS — I99.8 OTHER DISORDER OF CIRCULATORY SYSTEM: ICD-10-CM

## 2025-02-25 DIAGNOSIS — R10.9 UNSPECIFIED ABDOMINAL PAIN: ICD-10-CM

## 2025-02-25 DIAGNOSIS — E78.5 HYPERLIPIDEMIA, UNSPECIFIED: ICD-10-CM

## 2025-02-25 DIAGNOSIS — Z23 ENCOUNTER FOR IMMUNIZATION: ICD-10-CM

## 2025-02-25 DIAGNOSIS — R37 SEXUAL DYSFUNCTION, UNSPECIFIED: ICD-10-CM

## 2025-02-25 DIAGNOSIS — I10 ESSENTIAL (PRIMARY) HYPERTENSION: ICD-10-CM

## 2025-02-25 PROCEDURE — G2211 COMPLEX E/M VISIT ADD ON: CPT

## 2025-02-25 PROCEDURE — G0008: CPT

## 2025-02-25 PROCEDURE — 99214 OFFICE O/P EST MOD 30 MIN: CPT | Mod: GC

## 2025-02-25 PROCEDURE — 90662 IIV NO PRSV INCREASED AG IM: CPT

## 2025-03-05 ENCOUNTER — NON-APPOINTMENT (OUTPATIENT)
Age: 70
End: 2025-03-05

## 2025-03-05 LAB
ANION GAP SERPL CALC-SCNC: 11 MMOL/L
BUN SERPL-MCNC: 21 MG/DL
CALCIUM SERPL-MCNC: 9.3 MG/DL
CHLORIDE SERPL-SCNC: 96 MMOL/L
CO2 SERPL-SCNC: 23 MMOL/L
CREAT SERPL-MCNC: 1.03 MG/DL
CRP SERPL HS-MCNC: 1.83 MG/L
EGFR: 78 ML/MIN/1.73M2
ESTIMATED AVERAGE GLUCOSE: 258 MG/DL
GLUCOSE SERPL-MCNC: 310 MG/DL
HBA1C MFR BLD HPLC: 10.6 %
POTASSIUM SERPL-SCNC: 4.8 MMOL/L
SODIUM SERPL-SCNC: 130 MMOL/L

## 2025-03-14 ENCOUNTER — APPOINTMENT (OUTPATIENT)
Dept: CT IMAGING | Facility: CLINIC | Age: 70
End: 2025-03-14
Payer: SELF-PAY

## 2025-03-14 ENCOUNTER — OUTPATIENT (OUTPATIENT)
Dept: OUTPATIENT SERVICES | Facility: HOSPITAL | Age: 70
LOS: 1 days | End: 2025-03-14
Payer: SELF-PAY

## 2025-03-14 DIAGNOSIS — Z00.00 ENCOUNTER FOR GENERAL ADULT MEDICAL EXAMINATION WITHOUT ABNORMAL FINDINGS: ICD-10-CM

## 2025-03-14 DIAGNOSIS — E11.8 TYPE 2 DIABETES MELLITUS WITH UNSPECIFIED COMPLICATIONS: ICD-10-CM

## 2025-03-14 DIAGNOSIS — E78.5 HYPERLIPIDEMIA, UNSPECIFIED: ICD-10-CM

## 2025-03-14 PROCEDURE — 75571 CT HRT W/O DYE W/CA TEST: CPT | Mod: 26

## 2025-03-14 PROCEDURE — 75571 CT HRT W/O DYE W/CA TEST: CPT

## 2025-03-19 DIAGNOSIS — I25.10 ATHEROSCLEROTIC HEART DISEASE OF NATIVE CORONARY ARTERY W/OUT ANGINA PECTORIS: ICD-10-CM

## 2025-03-19 RX ORDER — CLOPIDOGREL BISULFATE 75 MG/1
75 TABLET, FILM COATED ORAL DAILY
Qty: 90 | Refills: 0 | Status: ACTIVE | COMMUNITY
Start: 2025-03-19 | End: 1900-01-01

## 2025-04-04 ENCOUNTER — APPOINTMENT (OUTPATIENT)
Dept: ENDOCRINOLOGY | Facility: CLINIC | Age: 70
End: 2025-04-04
Payer: MEDICARE

## 2025-04-04 VITALS
HEART RATE: 66 BPM | HEIGHT: 68 IN | BODY MASS INDEX: 31.07 KG/M2 | WEIGHT: 205 LBS | DIASTOLIC BLOOD PRESSURE: 67 MMHG | SYSTOLIC BLOOD PRESSURE: 156 MMHG | OXYGEN SATURATION: 98 %

## 2025-04-04 VITALS — DIASTOLIC BLOOD PRESSURE: 78 MMHG | SYSTOLIC BLOOD PRESSURE: 132 MMHG

## 2025-04-04 DIAGNOSIS — E78.5 HYPERLIPIDEMIA, UNSPECIFIED: ICD-10-CM

## 2025-04-04 DIAGNOSIS — E11.8 TYPE 2 DIABETES MELLITUS WITH UNSPECIFIED COMPLICATIONS: ICD-10-CM

## 2025-04-04 DIAGNOSIS — I10 ESSENTIAL (PRIMARY) HYPERTENSION: ICD-10-CM

## 2025-04-04 DIAGNOSIS — I25.10 ATHEROSCLEROTIC HEART DISEASE OF NATIVE CORONARY ARTERY W/OUT ANGINA PECTORIS: ICD-10-CM

## 2025-04-04 PROCEDURE — 99214 OFFICE O/P EST MOD 30 MIN: CPT

## 2025-04-04 PROCEDURE — 95251 CONT GLUC MNTR ANALYSIS I&R: CPT

## 2025-04-04 PROCEDURE — G2211 COMPLEX E/M VISIT ADD ON: CPT

## 2025-04-04 RX ORDER — TIRZEPATIDE 2.5 MG/.5ML
2.5 INJECTION, SOLUTION SUBCUTANEOUS
Qty: 1 | Refills: 5 | Status: ACTIVE | COMMUNITY
Start: 2025-04-04 | End: 1900-01-01

## 2025-04-04 RX ORDER — FLASH GLUCOSE SCANNING READER
EACH MISCELLANEOUS
Qty: 1 | Refills: 0 | Status: ACTIVE | COMMUNITY
Start: 2025-04-04 | End: 1900-01-01

## 2025-04-21 ENCOUNTER — APPOINTMENT (OUTPATIENT)
Dept: UROLOGY | Facility: CLINIC | Age: 70
End: 2025-04-21
Payer: MEDICARE

## 2025-04-21 VITALS
BODY MASS INDEX: 31.07 KG/M2 | TEMPERATURE: 97.3 F | HEIGHT: 68 IN | WEIGHT: 205 LBS | HEART RATE: 74 BPM | RESPIRATION RATE: 16 BRPM | OXYGEN SATURATION: 97 % | SYSTOLIC BLOOD PRESSURE: 154 MMHG | DIASTOLIC BLOOD PRESSURE: 76 MMHG

## 2025-04-21 DIAGNOSIS — N52.9 MALE ERECTILE DYSFUNCTION, UNSPECIFIED: ICD-10-CM

## 2025-04-21 DIAGNOSIS — N40.1 BENIGN PROSTATIC HYPERPLASIA WITH LOWER URINARY TRACT SYMPMS: ICD-10-CM

## 2025-04-21 PROCEDURE — G2211 COMPLEX E/M VISIT ADD ON: CPT

## 2025-04-21 PROCEDURE — 99213 OFFICE O/P EST LOW 20 MIN: CPT

## 2025-04-29 ENCOUNTER — APPOINTMENT (OUTPATIENT)
Dept: GERIATRICS | Facility: CLINIC | Age: 70
End: 2025-04-29
Payer: MEDICARE

## 2025-04-29 VITALS
HEIGHT: 68 IN | RESPIRATION RATE: 16 BRPM | SYSTOLIC BLOOD PRESSURE: 134 MMHG | OXYGEN SATURATION: 96 % | HEART RATE: 80 BPM | DIASTOLIC BLOOD PRESSURE: 71 MMHG | WEIGHT: 203 LBS | BODY MASS INDEX: 30.77 KG/M2 | TEMPERATURE: 97.6 F

## 2025-04-29 DIAGNOSIS — E11.8 TYPE 2 DIABETES MELLITUS WITH UNSPECIFIED COMPLICATIONS: ICD-10-CM

## 2025-04-29 DIAGNOSIS — R37 SEXUAL DYSFUNCTION, UNSPECIFIED: ICD-10-CM

## 2025-04-29 DIAGNOSIS — E78.5 HYPERLIPIDEMIA, UNSPECIFIED: ICD-10-CM

## 2025-04-29 DIAGNOSIS — I25.10 ATHEROSCLEROTIC HEART DISEASE OF NATIVE CORONARY ARTERY W/OUT ANGINA PECTORIS: ICD-10-CM

## 2025-04-29 DIAGNOSIS — I10 ESSENTIAL (PRIMARY) HYPERTENSION: ICD-10-CM

## 2025-04-29 PROCEDURE — G2211 COMPLEX E/M VISIT ADD ON: CPT

## 2025-04-29 PROCEDURE — 99214 OFFICE O/P EST MOD 30 MIN: CPT | Mod: GC

## 2025-05-14 ENCOUNTER — INPATIENT (INPATIENT)
Facility: HOSPITAL | Age: 70
LOS: 1 days | Discharge: HOME | End: 2025-05-16
Attending: INTERNAL MEDICINE | Admitting: INTERNAL MEDICINE
Payer: MEDICARE

## 2025-05-14 ENCOUNTER — TRANSCRIPTION ENCOUNTER (OUTPATIENT)
Age: 70
End: 2025-05-14

## 2025-05-14 VITALS
RESPIRATION RATE: 16 BRPM | HEIGHT: 68 IN | SYSTOLIC BLOOD PRESSURE: 168 MMHG | HEART RATE: 67 BPM | DIASTOLIC BLOOD PRESSURE: 71 MMHG | TEMPERATURE: 99 F | WEIGHT: 203.93 LBS | OXYGEN SATURATION: 98 %

## 2025-05-14 DIAGNOSIS — Z98.890 OTHER SPECIFIED POSTPROCEDURAL STATES: Chronic | ICD-10-CM

## 2025-05-14 DIAGNOSIS — Z98.49 CATARACT EXTRACTION STATUS, UNSPECIFIED EYE: Chronic | ICD-10-CM

## 2025-05-14 LAB
ANION GAP SERPL CALC-SCNC: 12 MMOL/L — SIGNIFICANT CHANGE UP (ref 7–14)
BUN SERPL-MCNC: 14 MG/DL — SIGNIFICANT CHANGE UP (ref 7–23)
CALCIUM SERPL-MCNC: 9.2 MG/DL — SIGNIFICANT CHANGE UP (ref 8.4–10.5)
CHLORIDE SERPL-SCNC: 96 MMOL/L — LOW (ref 98–107)
CO2 SERPL-SCNC: 26 MMOL/L — SIGNIFICANT CHANGE UP (ref 22–31)
CREAT SERPL-MCNC: 0.82 MG/DL — SIGNIFICANT CHANGE UP (ref 0.5–1.3)
EGFR: 94 ML/MIN/1.73M2 — SIGNIFICANT CHANGE UP
EGFR: 94 ML/MIN/1.73M2 — SIGNIFICANT CHANGE UP
GLUCOSE BLDC GLUCOMTR-MCNC: 148 MG/DL — HIGH (ref 70–99)
GLUCOSE BLDC GLUCOMTR-MCNC: 215 MG/DL — HIGH (ref 70–99)
GLUCOSE SERPL-MCNC: 160 MG/DL — HIGH (ref 70–99)
HCT VFR BLD CALC: 43.3 % — SIGNIFICANT CHANGE UP (ref 39–50)
HGB BLD-MCNC: 13.7 G/DL — SIGNIFICANT CHANGE UP (ref 13–17)
MCHC RBC-ENTMCNC: 28.1 PG — SIGNIFICANT CHANGE UP (ref 27–34)
MCHC RBC-ENTMCNC: 31.6 G/DL — LOW (ref 32–36)
MCV RBC AUTO: 88.9 FL — SIGNIFICANT CHANGE UP (ref 80–100)
NRBC # BLD AUTO: 0 K/UL — SIGNIFICANT CHANGE UP (ref 0–0)
NRBC # FLD: 0 K/UL — SIGNIFICANT CHANGE UP (ref 0–0)
NRBC BLD AUTO-RTO: 0 /100 WBCS — SIGNIFICANT CHANGE UP (ref 0–0)
PLATELET # BLD AUTO: 192 K/UL — SIGNIFICANT CHANGE UP (ref 150–400)
POTASSIUM SERPL-MCNC: 4 MMOL/L — SIGNIFICANT CHANGE UP (ref 3.5–5.3)
POTASSIUM SERPL-SCNC: 4 MMOL/L — SIGNIFICANT CHANGE UP (ref 3.5–5.3)
RBC # BLD: 4.87 M/UL — SIGNIFICANT CHANGE UP (ref 4.2–5.8)
RBC # FLD: 12.8 % — SIGNIFICANT CHANGE UP (ref 10.3–14.5)
SODIUM SERPL-SCNC: 134 MMOL/L — LOW (ref 135–145)
WBC # BLD: 7.78 K/UL — SIGNIFICANT CHANGE UP (ref 3.8–10.5)
WBC # FLD AUTO: 7.78 K/UL — SIGNIFICANT CHANGE UP (ref 3.8–10.5)

## 2025-05-14 PROCEDURE — 93010 ELECTROCARDIOGRAM REPORT: CPT

## 2025-05-14 RX ORDER — SODIUM CHLORIDE 9 G/1000ML
1000 INJECTION, SOLUTION INTRAVENOUS
Refills: 0 | Status: DISCONTINUED | OUTPATIENT
Start: 2025-05-14 | End: 2025-05-16

## 2025-05-14 RX ORDER — CARVEDILOL 3.12 MG/1
12.5 TABLET, FILM COATED ORAL EVERY 12 HOURS
Refills: 0 | Status: DISCONTINUED | OUTPATIENT
Start: 2025-05-14 | End: 2025-05-14

## 2025-05-14 RX ORDER — INSULIN LISPRO 100 U/ML
INJECTION, SOLUTION INTRAVENOUS; SUBCUTANEOUS AT BEDTIME
Refills: 0 | Status: DISCONTINUED | OUTPATIENT
Start: 2025-05-14 | End: 2025-05-16

## 2025-05-14 RX ORDER — INSULIN LISPRO 100 U/ML
22 INJECTION, SOLUTION INTRAVENOUS; SUBCUTANEOUS
Refills: 0 | Status: DISCONTINUED | OUTPATIENT
Start: 2025-05-14 | End: 2025-05-15

## 2025-05-14 RX ORDER — INSULIN LISPRO 100 U/ML
INJECTION, SOLUTION INTRAVENOUS; SUBCUTANEOUS
Refills: 0 | Status: DISCONTINUED | OUTPATIENT
Start: 2025-05-14 | End: 2025-05-15

## 2025-05-14 RX ORDER — INSULIN GLARGINE-YFGN 100 [IU]/ML
28 INJECTION, SOLUTION SUBCUTANEOUS
Refills: 0 | DISCHARGE

## 2025-05-14 RX ORDER — DEXTROSE 50 % IN WATER 50 %
12.5 SYRINGE (ML) INTRAVENOUS ONCE
Refills: 0 | Status: DISCONTINUED | OUTPATIENT
Start: 2025-05-14 | End: 2025-05-16

## 2025-05-14 RX ORDER — LINACLOTIDE 290 UG/1
145 CAPSULE, GELATIN COATED ORAL
Refills: 0 | Status: DISCONTINUED | OUTPATIENT
Start: 2025-05-15 | End: 2025-05-16

## 2025-05-14 RX ORDER — DEXTROSE 50 % IN WATER 50 %
25 SYRINGE (ML) INTRAVENOUS ONCE
Refills: 0 | Status: DISCONTINUED | OUTPATIENT
Start: 2025-05-14 | End: 2025-05-16

## 2025-05-14 RX ORDER — CLOPIDOGREL BISULFATE 75 MG/1
1 TABLET, FILM COATED ORAL
Refills: 0 | DISCHARGE

## 2025-05-14 RX ORDER — LINACLOTIDE 290 UG/1
1 CAPSULE, GELATIN COATED ORAL
Refills: 0 | DISCHARGE

## 2025-05-14 RX ORDER — CLOPIDOGREL BISULFATE 75 MG/1
75 TABLET, FILM COATED ORAL DAILY
Refills: 0 | Status: DISCONTINUED | OUTPATIENT
Start: 2025-05-14 | End: 2025-05-16

## 2025-05-14 RX ORDER — INSULIN GLARGINE-YFGN 100 [IU]/ML
14 INJECTION, SOLUTION SUBCUTANEOUS AT BEDTIME
Refills: 0 | Status: DISCONTINUED | OUTPATIENT
Start: 2025-05-14 | End: 2025-05-16

## 2025-05-14 RX ORDER — DEXTROSE 50 % IN WATER 50 %
15 SYRINGE (ML) INTRAVENOUS ONCE
Refills: 0 | Status: DISCONTINUED | OUTPATIENT
Start: 2025-05-14 | End: 2025-05-16

## 2025-05-14 RX ORDER — GLUCAGON 3 MG/1
1 POWDER NASAL ONCE
Refills: 0 | Status: DISCONTINUED | OUTPATIENT
Start: 2025-05-14 | End: 2025-05-16

## 2025-05-14 RX ORDER — TIRZEPATIDE 7.5 MG/.5ML
2.5 INJECTION, SOLUTION SUBCUTANEOUS
Refills: 0 | DISCHARGE

## 2025-05-14 RX ORDER — INSULIN LISPRO 100 U/ML
INJECTION, SOLUTION INTRAVENOUS; SUBCUTANEOUS
Refills: 0 | Status: DISCONTINUED | OUTPATIENT
Start: 2025-05-14 | End: 2025-05-16

## 2025-05-14 RX ORDER — TADALAFIL 20 MG/1
1 TABLET, FILM COATED ORAL
Refills: 0 | DISCHARGE

## 2025-05-14 RX ORDER — ATORVASTATIN CALCIUM 80 MG/1
40 TABLET, FILM COATED ORAL AT BEDTIME
Refills: 0 | Status: DISCONTINUED | OUTPATIENT
Start: 2025-05-14 | End: 2025-05-16

## 2025-05-14 RX ORDER — AMLODIPINE BESYLATE 10 MG/1
1 TABLET ORAL
Refills: 0 | DISCHARGE

## 2025-05-14 RX ORDER — AMLODIPINE BESYLATE 10 MG/1
5 TABLET ORAL DAILY
Refills: 0 | Status: DISCONTINUED | OUTPATIENT
Start: 2025-05-14 | End: 2025-05-15

## 2025-05-14 RX ORDER — CARVEDILOL 3.12 MG/1
1 TABLET, FILM COATED ORAL
Refills: 0 | DISCHARGE

## 2025-05-14 RX ADMIN — Medication 50 MILLIGRAM(S): at 20:04

## 2025-05-14 RX ADMIN — ATORVASTATIN CALCIUM 40 MILLIGRAM(S): 80 TABLET, FILM COATED ORAL at 21:39

## 2025-05-14 RX ADMIN — Medication 3 MILLILITER(S): at 21:32

## 2025-05-14 RX ADMIN — Medication 25 MILLIGRAM(S): at 22:23

## 2025-05-14 RX ADMIN — INSULIN GLARGINE-YFGN 14 UNIT(S): 100 INJECTION, SOLUTION SUBCUTANEOUS at 21:40

## 2025-05-14 NOTE — H&P CARDIOLOGY - HISTORY OF PRESENT ILLNESS
69 y.o. male with PMH of HTN, HLD, Diabetes Mellitus 2,  presents today for elective cardiac catheterization. The patient denies chest pain, SOB, palpitations, presyncope, syncope,  headache, visual disturbances, CVA, PE, DVT, [place], abdominal pain, N/V/D/C, hematochezia, melena, dysuria, hematuria, fever, chills. The patient was evaluated by a cardiologist. Due to patient's symptoms and abnormal non invasive findings, the patient  was recommended to have cardiac catheterization. The patient denies any complaints at present.     referring physician, [place]  stress test 11/2023 - normal myocardial perfusion   69 y.o. male with PMH of HTN, HLD, Diabetes Mellitus 2, ED, GERD-  presents today for elective cardiac catheterization.  The patient denies chest pain, SOB, palpitations, presyncope, syncope,  headache, visual disturbances, CVA, PE, DVT, [place], abdominal pain, N/V/D,  hematochezia, melena, dysuria, hematuria, fever, chills. He admits to constipation and epigastric abdominal discomfort for many years. As per patient, he was seen by a gastroenterologist, was told that there is no GI pathology. The patient was evaluated by a cardiologist. Due to patient's symptoms, the patient  was recommended to have cardiac catheterization. The patient denies any complaints at present.     referring physician, [place]  stress test 11/2023 - normal myocardial perfusion   69 y.o. male with PMH of HTN, HLD, Diabetes Mellitus 2, ED, GERD-  presents today for elective cardiac catheterization.  The patient denies chest pain, SOB, palpitations, presyncope, syncope,  headache, visual disturbances, CVA, PE, DVT, [place], abdominal pain, N/V/D,  hematochezia, melena, dysuria, hematuria, fever, chills. He admits to constipation and epigastric abdominal discomfort for many years. As per patient, he was seen by a gastroenterologist, was told that there is no GI pathology. The patient was evaluated by a cardiologist, was found to have abnormal CT calcium score test.  Due to patient's symptoms and abnormal non invasive test results,  the patient  was recommended to have cardiac catheterization. The patient denies any complaints at present.     referring physician, [place]  stress test 11/2023 - normal myocardial perfusion  CT Heart Calcium Score (03.14.25 @ 08:15) >  1.  Extensive coronary artery calcification (Agatston score 1008 AU) as   delineated above.  2.  The observed calcium score of 1008 is at percentile 94 for   individuals of the same age, gender, and race/ethnicity who are free of   clinical cardiovascular disease and treated diabetes ([employer] database).

## 2025-05-14 NOTE — H&P CARDIOLOGY - NSICDXPASTMEDICALHX_GEN_ALL_CORE_FT
PAST MEDICAL HISTORY:  DM2 (diabetes mellitus, type 2)     GERD (gastroesophageal reflux disease)     HLD (hyperlipidemia)     HTN (hypertension), benign      PAST MEDICAL HISTORY:  DM2 (diabetes mellitus, type 2)     Erectile dysfunction     GERD (gastroesophageal reflux disease)     HLD (hyperlipidemia)     HTN (hypertension), benign

## 2025-05-14 NOTE — H&P CARDIOLOGY - COMMENTS
Pre-sedation evaluation    Dentures: none  Last PO intake: 5/14/25 21:30    Level of consciousness: 1  Obstructive sleep apnea: Unknown  Aspiration risk: No  Mallampati score: 2  ASA Classification: 2  Prior Sedative or Anesthesia Experience: No complications  Informed consent by responsible adult: Yes  Responsible adult escort: Yes  Based on today's assessment, anesthesia consult requested: No

## 2025-05-14 NOTE — PATIENT PROFILE ADULT - FUNCTIONAL ASSESSMENT - DAILY ACTIVITY 3.
Physical Therapy -  Daily Treatment/Progress Note     Patient: John Valle  Location: [place] [address]  MRN: [id number]  Today's date: 12/22/2023     Spoke with RN prior to treatment, no contradictions noted.     Patient left upright in chair with VSS, call bell and personal items within reach; RN aware.     HISTORY OF PRESENT ILLNESS     John is a 77 y.o. male admitted on 12/20/2023 with Lower urinary tract infection [N39.0]  Complicated UTI (urinary tract infection) [N39.0]  COVID-19 [U07.1]. Principal problem is Complicated UTI (urinary tract infection).    Past Medical History  John has past medical history of DM, hypertension, CVA, renal cancer status post left nephrectomy and prostate cancer.    History of Present Illness   Presents with UTI and found to COVID-positive.    PRIOR LEVEL OF FUNCTION AND LIVING ENVIRONMENT     Prior Level of Function    Flowsheet Row Most Recent Value   Ambulation assistive equipment   Transferring assistive equipment   Toileting assistive equipment   Bathing assistive person   Dressing assistive person   IADLs independent   Driving/Transportation friends/family   Communication understands/communicates without difficulty   Prior Level of Function Comment Patient modified independent with rollator/4WW. His wife assists with stair climbing. Patient receives assist as needed for ADLs. He has private pay PT 2x/week in [place]. Currently visiting his daughter for the holidays.   Assistive Device Currently Used at Home shower chair, walker, front-wheeled, walker, 4-wheeled, grab bar, raised toilet        Prior Living Environment    Flowsheet Row Most Recent Value   People in Home spouse   Current Living Arrangements home   Home Accessibility stairs within home (Group), stairs to enter home (Group)   Living Environment Comment Patient lives with his wife in a 2 story home with 3 MARGARETH and full flight with B HR to 2nd floor. Walk in shower with  SC and GB. Patient lives in [place],  visiting daughter in [place] who lives in a 2 story home with 4 MARGARETH and full flight with unilateral HR to 2nd floor bed/bath.        VITALS AND PAIN     PT Vitals    Date/Time Pulse HR Source SpO2 Pt Activity O2 Therapy BP BP Location BP Method Pt Position [facility]   12/22/23 1210 65 Monitor 98 % At rest None (Room air) 148/68 Right upper arm Automatic Lying ES      PT Pain    Date/Time Pain Type Rating: Rest Rating: Activity [facility]   12/22/23 1210 Pain Assessment 0 0 ES           Objective   OBJECTIVE     Start time:  1208  End time:  1241  Session Length: 33 min  Mode of Treatment: individual therapy, physical therapy    General Observations  Patient received upright, in bed. He was agreeable to therapy, no issues or concerns identified by nurse prior to session. Awake and alert. Wife at bedside.    Precautions: fall, enhanced contact and droplet (COVID 19+)       Limitations/Impairments: safety/cognitive      PT Eval and Treat - 12/22/23 1208        Cognition    Orientation Status oriented to;person;place     Affect/Mental Status WFL     Follows Commands follows one-step commands;physical/tactile prompts required;repetition of directions required;verbal cues/prompting required;increased processing time needed     Cognitive Function executive function deficit;safety deficit     Comment, Cognition Patient pleasant and cooperative during session. Mild confusion noted. Oriented to self and place, wife reports that she often reminds him of the date at his baseline. Patient receptive to education and motivated to participate in therapy. Patient follows 1 step commands with increased time and repetition of cues provided.        Bed Mobility    Bed Mobility Activities supine to sit     [place] minimum assist (75% or more patient effort);1 person assist     Safety/Cues increased time to complete;hand placement;sequencing;technique     Assistive Device bed rails;head of bed elevated      Comment Patient sat up to left EOB with increased time. Patient reports that his wife helps him out of bed at home.        Mobility Belt    Mobility Belt Used for All Out of Bed Activity yes        Sit/Stand Transfer    Surface edge of bed     [place] close supervision     Safety/Cues increased time to complete;hand placement;technique;sequencing     Assistive Device walker, front-wheeled     Transfer Comments Cues for hand placement. Slow to rise.        Gait Training    [place], Gait close supervision     Safety/Cues increased time to complete;hand placement;technique;sequencing     Assistive Device walker, front-wheeled     Distance in Feet 40 feet     Pattern step-through     Deviations/Abnormal Patterns gait speed decreased;step length decreased     Comment (Gait/Stairs) Patient with slow pace and decreased step length. Cues to avoid obstacles in room at times. No LOB.        Stairs Training    [place], Stairs close supervision     Safety/Cues increased time to complete;hand placement;sequencing;technique     Assistive Device railing     Handrail Location (Stairs) both sides     Number of Stairs 5     Ascending Stairs Technique step-to-step     Descending Stairs Technique step-to-step     Comment No LOB with stair climbing. Cues for sequencing. Patient's wife provides supervision with stair climbing at baseline.        Balance    Static Sitting Balance WFL;sitting in chair;sitting, edge of bed     Dynamic Sitting Balance WFL;sitting, edge of bed     Sit to Stand Dynamic Balance mild impairment;supported     Static Standing Balance WFL;supported     Dynamic Standing Balance mild impairment;supported     Comment, Balance Benefits from RW. No LOB.        Impairments/Safety Issues    Impairments Affecting Function balance;endurance/activity tolerance;strength     Safety Issues Affecting Function positioning of assistive device;sequencing abilities                               Education  Documentation  Home Safety, taught by Gracie Zazueta PT at 12/22/2023  4:01 PM.  Learner: Patient  Readiness: Acceptance  Method: Explanation  Response: Verbalizes Understanding  Comment: Patient educated on PT plan of care, pacing with activity, and safety with mobility.    Energy Conservation, taught by Gracie Zazueta PT at 12/22/2023  4:01 PM.  Learner: Patient  Readiness: Acceptance  Method: Explanation  Response: Verbalizes Understanding  Comment: Patient educated on PT plan of care, pacing with activity, and safety with mobility.    Assistive/Adaptive Devices, taught by Gracie Zazueta PT at 12/22/2023  4:01 PM.  Learner: Patient  Readiness: Acceptance  Method: Explanation  Response: Verbalizes Understanding  Comment: Patient educated on PT plan of care, pacing with activity, and safety with mobility.        Session Outcome  Patient upright, in chair at end of session, all needs met, call light in reach, chair alarm on, personal items in reach. Nursing notified about change in vital signs, patient's response to therapy/activity, patient's performance, and patient's position.    AM-PAC™ - Mobility (Current Function)     Turning form your back to your side while in flat bed without using bedrails 3 - A Little   Moving from lying on your back to sitting on the side of a flat bed without using bedrails 3 - A Little   Moving to and from a bed to a chair 3 - A Little   Standing up from a chair using your arms 3 - A Little   To walk in a hospital room 3 - A Little   Climbing 3-5 steps with a railing 3 - A Little   AM-PAC™ Mobility Score 18      ASSESSMENT AND PLAN     Progress Summary  Patient required close supervision for transfers, ambulation with RW, and stair climbing. No LOB. Patient hsa supportive family who assists with mobility and ADLs at baseline. Recommend resuming home health PT once back in [place]. No DME needs noted. Recommend home with family at discharge.    Patient/Family Therapy Goals  Statement: to go home    PT Plan    Flowsheet Row Most Recent Value   Rehab Potential good, to achieve stated therapy goals at 12/22/2023 1208   Therapy Frequency 4 times/wk at 12/22/2023 1208   Planned Therapy Interventions gait training, bed mobility training, balance training, transfer training, strengthening, stair training at 12/22/2023 1208          PT Discharge Recommendations    Flowsheet Row Most Recent Value   PT Recommended Discharge Disposition home with assistance at 12/22/2023 1208   Anticipated Equipment Needs if Discharged Home (PT) none at 12/22/2023 1208               PT Goals    Flowsheet Row Most Recent Value   Bed Mobility Goal 1    Activity/Assistive Device sit to supine/supine to sit at 12/21/2023 1503   [place] modified independence at 12/21/2023 1503   Time Frame by discharge at 12/21/2023 1503   Progress/Outcome goal ongoing at 12/21/2023 1503   Transfer Goal 1    Activity/Assistive Device sit-to-stand/stand-to-sit, bed-to-chair/chair-to-bed at 12/21/2023 1503   [place] modified independence at 12/21/2023 1503   Time Frame by discharge at 12/21/2023 1503   Progress/Outcome goal ongoing at 12/21/2023 1503   Gait Training Goal 1    Activity/Assistive Device gait (walking locomotion), assistive device use at 12/21/2023 1503   [place] modified independence at 12/21/2023 1503   Distance 150 at 12/21/2023 1503   Time Frame by discharge at 12/21/2023 1503   Progress/Outcome goal ongoing at 12/21/2023 1503   Stairs Goal 1    Activity/Assistive Device ascending stairs, descending stairs at 12/21/2023 1503   [place] supervision required at 12/21/2023 1503   Number of Stairs 4 at 12/21/2023 1503   Time Frame by discharge at 12/21/2023 1503   Progress/Outcome goal ongoing at 12/21/2023 1503         4 = No assist / stand by assistance

## 2025-05-14 NOTE — ASU DISCHARGE PLAN (ADULT/PEDIATRIC) - NS MD DC FALL RISK RISK
For information on Fall & Injury Prevention, visit: [email or web address] OR  [email or web address] OR  https://www.cdc.gov/steadi/patient.html

## 2025-05-14 NOTE — ASU DISCHARGE PLAN (ADULT/PEDIATRIC) - FINANCIAL ASSISTANCE
[facility] provides services at a reduced cost to those who are determined to be eligible through [facility]’s financial assistance program. Information regarding [facility]’s financial assistance program can be found by going to [email or web address] or by calling [phone].

## 2025-05-14 NOTE — CONSULT NOTE ADULT - CONSULT REQUESTED DATE/TIME
14-May-2025 20:33 Complex Repair And Double Advancement Flap Text: The defect edges were debeveled with a #15 scalpel blade.  The primary defect was closed partially with a complex linear closure.  Given the location of the remaining defect, shape of the defect and the proximity to free margins a double advancement flap was deemed most appropriate for complete closure of the defect.  Using a sterile surgical marker, an appropriate advancement flap was drawn incorporating the defect and placing the expected incisions within the relaxed skin tension lines where possible.    The area thus outlined was incised deep to adipose tissue with a #15 scalpel blade.  The skin margins were undermined to an appropriate distance in all directions utilizing iris scissors.

## 2025-05-14 NOTE — CONSULT NOTE ADULT - ATTENDING COMMENTS
Briefly, this is a 69 y.o. male with PMH of HTN, HLD, Diabetes Mellitus 2, ED, GERD and a hs of a localized rash with both aspirin and ibuprofen. Given this hx and the need for ASA agree with plan for desensitization to aspirin.

## 2025-05-14 NOTE — CONSULT NOTE ADULT - SUBJECTIVE AND OBJECTIVE BOX
Patient is a 69y old  Male who presents with a chief complaint of   HPI:  69 y.o. male with PMH of HTN, HLD, Diabetes Mellitus 2, ED, GERD-  presents today for elective cardiac catheterization.  The patient denies chest pain, SOB, palpitations, presyncope, syncope,  headache, visual disturbances, CVA, PE, DVT, [place], abdominal pain, N/V/D,  hematochezia, melena, dysuria, hematuria, fever, chills. He admits to constipation and epigastric abdominal discomfort for many years. As per patient, he was seen by a gastroenterologist, was told that there is no GI pathology. The patient was evaluated by a cardiologist, was found to have abnormal CT calcium score test.  Due to patient's symptoms and abnormal non invasive test results,  the patient  was recommended to have cardiac catheterization. The patient denies any complaints at present.     referring physician, [place]  stress test 11/2023 - normal myocardial perfusion  CT Heart Calcium Score (03.14.25 @ 08:15) >  1.  Extensive coronary artery calcification (Agatston score 1008 AU) as   delineated above.  2.  The observed calcium score of 1008 is at percentile 94 for   individuals of the same age, gender, and race/ethnicity who are free of   clinical cardiovascular disease and treated diabetes ([facility] database).       (14 May 2025 15:21)    Allergy was consulted due to patient's history of aspirin allergy and need for desensitization in setting of upcoming stenting.  History was obtained additionally from daughter and patient.      PAST MEDICAL & SURGICAL HISTORY:  HTN (hypertension), benign      HLD (hyperlipidemia)      DM2 (diabetes mellitus, type 2)      GERD (gastroesophageal reflux disease)      Erectile dysfunction      History of ankle surgery      H/O cataract extraction            Allergies    aspirin (Unknown)    Intolerances      MEDICATIONS  (STANDING):  amLODIPine   Tablet 5 milliGRAM(s) Oral daily  atorvastatin 40 milliGRAM(s) Oral at bedtime  carvedilol 12.5 milliGRAM(s) Oral every 12 hours  chlorhexidine 2% Cloths 1 Application(s) Topical <User Schedule>  clopidogrel Tablet 75 milliGRAM(s) Oral daily  dextrose 5%. 1000 milliLiter(s) (100 mL/Hr) IV Continuous <Continuous>  dextrose 5%. 1000 milliLiter(s) (50 mL/Hr) IV Continuous <Continuous>  dextrose 50% Injectable 25 Gram(s) IV Push once  dextrose 50% Injectable 12.5 Gram(s) IV Push once  dextrose 50% Injectable 25 Gram(s) IV Push once  glucagon  Injectable 1 milliGRAM(s) IntraMuscular once  hydrALAZINE 100 milliGRAM(s) Oral <User Schedule>  hydrALAZINE 50 milliGRAM(s) Oral <User Schedule>  insulin glargine Injectable (LANTUS) 14 Unit(s) SubCutaneous at bedtime  insulin lispro (ADMELOG) corrective regimen sliding scale   SubCutaneous three times a day before meals  insulin lispro (ADMELOG) corrective regimen sliding scale   SubCutaneous at bedtime  insulin lispro (ADMELOG) corrective regimen sliding scale   SubCutaneous three times a day before meals  insulin lispro Injectable (ADMELOG) 22 Unit(s) SubCutaneous before dinner  pantoprazole    Tablet 40 milliGRAM(s) Oral before breakfast  sodium chloride 0.9% lock flush 3 milliLiter(s) IV Push every 8 hours    MEDICATIONS  (PRN):  dextrose Oral Gel 15 Gram(s) Oral once PRN Blood Glucose LESS THAN 70 milliGRAM(s)/deciliter      FAMILY HISTORY:  No pertinent family history in first degree relatives        Daily Height in cm: 172.72 (14 May 2025 15:46)    Daily   Vital Signs Last 24 Hrs  T(C): 36.9 (14 May 2025 18:20), Max: 37 (14 May 2025 15:46)  T(F): 98.4 (14 May 2025 18:20), Max: 98.6 (14 May 2025 15:46)  HR: 76 (14 May 2025 18:30) (67 - 76)  BP: 154/86 (14 May 2025 19:00) (144/63 - 168/71)  BP(mean): --  RR: 16 (14 May 2025 18:30) (16 - 16)  SpO2: 98% (14 May 2025 18:30) (98% - 98%)        Physical Exam:  General:	                    No apparent distress  HEENT:	                    Normocephalic atraumatic  Cardiovascular	Regular rate, normal S1, S2, no murmurs  Respiratory	CTAB, no retracractions  Abdominal	Normal:      Soft, ND, NT, bowel sounds present, no masses, no organomegaly  Extremities	No cyanosis or edema, 2+ pulses  Skin		Intact and not indurated, no rash, no desquamation  Neurologic	Normal:      Grossly Intact    Lab Results:                        13.7   7.78  )-----------( 192      ( 14 May 2025 15:35 )             43.3     14 May 2025 15:35    134    |  96     |  14     ----------------------------<  160    4.0     |  26     |  0.82     Ca    9.2        14 May 2025 15:35        Urinalysis Basic - ( 14 May 2025 15:35 )    Color: x / Appearance: x / SG: x / pH: x  Gluc: 160 mg/dL / Ketone: x  / Bili: x / Urobili: x   Blood: x / Protein: x / Nitrite: x   Leuk Esterase: x / RBC: x / WBC x   Sq Epi: x / Non Sq Epi: x / Bacteria: x       HPI:  69 y.o. male with PMH of HTN, HLD, Diabetes Mellitus 2, ED, GERD-  presents today for elective cardiac catheterization.  The patient denies chest pain, SOB, palpitations, presyncope, syncope,  headache, visual disturbances, CVA, PE, DVT, [place], abdominal pain, N/V/D,  hematochezia, melena, dysuria, hematuria, fever, chills. He admits to constipation and epigastric abdominal discomfort for many years. As per patient, he was seen by a gastroenterologist, was told that there is no GI pathology. The patient was evaluated by a cardiologist, was found to have abnormal CT calcium score test.  Due to patient's symptoms and abnormal non invasive test results,  the patient  was recommended to have cardiac catheterization. The patient denies any complaints at present.     referring physician, [place]  stress test 11/2023 - normal myocardial perfusion  CT Heart Calcium Score (03.14.25 @ 08:15) >  1.  Extensive coronary artery calcification (Agatston score 1008 AU) as   delineated above.  2.  The observed calcium score of 1008 is at percentile 94 for   individuals of the same age, gender, and race/ethnicity who are free of   clinical cardiovascular disease and treated diabetes ([facility] [facility] database).       (14 May 2025 15:21)    Allergy was consulted due to patient's history of aspirin allergy and need for desensitization in setting of upcoming stenting.  History was obtained additionally from daughter and patient.    Patient last took Aspirin at least 4-5 years ago, but could have been much longer. He reports developing a rash with both aspirin and ibuprofen - he was not sure originally of the trigger but went to his doctor who told him this was the likely culprit, and has since avoided. He and daughter both deny ever having a severe reaction affecting his breathing, requiring hospitalization or requiring epinephrine for treatment. He denies coughing or wheezing or chest tightness taking aspirin.     He denies a history of asthma, nasal polyps, random recurrent hives or angioedema.   He reports a history of environmental allergies responsive to PRN claritin.      PAST MEDICAL & SURGICAL HISTORY:  HTN (hypertension), benign      HLD (hyperlipidemia)      DM2 (diabetes mellitus, type 2)      GERD (gastroesophageal reflux disease)      Erectile dysfunction      History of ankle surgery      H/O cataract extraction            Allergies    aspirin (Unknown)    Intolerances      MEDICATIONS  (STANDING):  amLODIPine   Tablet 5 milliGRAM(s) Oral daily  atorvastatin 40 milliGRAM(s) Oral at bedtime  carvedilol 12.5 milliGRAM(s) Oral every 12 hours  chlorhexidine 2% Cloths 1 Application(s) Topical <User Schedule>  clopidogrel Tablet 75 milliGRAM(s) Oral daily  dextrose 5%. 1000 milliLiter(s) (100 mL/Hr) IV Continuous <Continuous>  dextrose 5%. 1000 milliLiter(s) (50 mL/Hr) IV Continuous <Continuous>  dextrose 50% Injectable 25 Gram(s) IV Push once  dextrose 50% Injectable 12.5 Gram(s) IV Push once  dextrose 50% Injectable 25 Gram(s) IV Push once  glucagon  Injectable 1 milliGRAM(s) IntraMuscular once  hydrALAZINE 100 milliGRAM(s) Oral <User Schedule>  hydrALAZINE 50 milliGRAM(s) Oral <User Schedule>  insulin glargine Injectable (LANTUS) 14 Unit(s) SubCutaneous at bedtime  insulin lispro (ADMELOG) corrective regimen sliding scale   SubCutaneous three times a day before meals  insulin lispro (ADMELOG) corrective regimen sliding scale   SubCutaneous at bedtime  insulin lispro (ADMELOG) corrective regimen sliding scale   SubCutaneous three times a day before meals  insulin lispro Injectable (ADMELOG) 22 Unit(s) SubCutaneous before dinner  pantoprazole    Tablet 40 milliGRAM(s) Oral before breakfast  sodium chloride 0.9% lock flush 3 milliLiter(s) IV Push every 8 hours    MEDICATIONS  (PRN):  dextrose Oral Gel 15 Gram(s) Oral once PRN Blood Glucose LESS THAN 70 milliGRAM(s)/deciliter      FAMILY HISTORY:  No pertinent family history in first degree relatives        Daily Height in cm: 172.72 (14 May 2025 15:46)    Daily   Vital Signs Last 24 Hrs  T(C): 36.9 (14 May 2025 18:20), Max: 37 (14 May 2025 15:46)  T(F): 98.4 (14 May 2025 18:20), Max: 98.6 (14 May 2025 15:46)  HR: 76 (14 May 2025 18:30) (67 - 76)  BP: 154/86 (14 May 2025 19:00) (144/63 - 168/71)  BP(mean): --  RR: 16 (14 May 2025 18:30) (16 - 16)  SpO2: 98% (14 May 2025 18:30) (98% - 98%)        Physical Exam:  General:	                    No apparent distress  HEENT:	                    Normocephalic atraumatic  Respiratory	No retractions; breathing comfortably on RA  Skin		Intact and not indurated, no visible rash, no desquamation  Neurologic	Normal:      Grossly Intact    Lab Results:                        13.7   7.78  )-----------( 192      ( 14 May 2025 15:35 )             43.3     14 May 2025 15:35    134    |  96     |  14     ----------------------------<  160    4.0     |  26     |  0.82     Ca    9.2        14 May 2025 15:35        Urinalysis Basic - ( 14 May 2025 15:35 )    Color: x / Appearance: x / SG: x / pH: x  Gluc: 160 mg/dL / Ketone: x  / Bili: x / Urobili: x   Blood: x / Protein: x / Nitrite: x   Leuk Esterase: x / RBC: x / WBC x   Sq Epi: x / Non Sq Epi: x / Bacteria: x

## 2025-05-14 NOTE — H&P CARDIOLOGY - RS GEN PE MLT RESP DETAILS PC
There are no preventive care reminders to display for this patient.    Patient is up to date, no discussion needed.             no rales/no rhonchi/no wheezes

## 2025-05-14 NOTE — PATIENT PROFILE ADULT - NSPROEXTENSIONSOFSELF_GEN_A_NUR
Is the patient currently in the state of [place]? YES    Visit mode:VIDEO    If the visit is dropped, the patient can be reconnected by: VIDEO VISIT: Text to cell phone:   Telephone Information:   Mobile [phone]       Will anyone else be joining the visit? NO  (If patient encounters technical issues they should call [phone]    How would you like to obtain your AVS? MyChart    Are changes needed to the allergy or medication list? No    Are refills needed on medications prescribed by this physicians? No- patient needs some duplicates / has questions regarding Humalog pen or solution. Doesn't know which one he takes.     Reason for visit: RECHECK Bobbilynn Grossaint VVF       none

## 2025-05-14 NOTE — H&P CARDIOLOGY - SURGICAL SITE INCISION
no
Pt is a 19yo f, who p/w r ankle pain after twisting injury. No head trauma/ loc. No neck/ back pain. Pt was able to ambulate afterwards. Afebrile. Tachycardic on arrival, improved without tx, likely 2/2 pain/ anxiety. EKG non-ischemic, no arrythmia noted. WNWD f in nad. R ankle: + mild swelling and ttp to lateral malleolus. No ttp to knee, prox tib-fib, medial mal, foot. + from at ankle jt. 2+ dp/pt pulses. Motor/ sensation intact. Xrays of ankle neg for acute fx/ dislocation. Air splint applied and pt to f/u w/ pcp for re-evaluation.

## 2025-05-14 NOTE — CONSULT NOTE ADULT - ASSESSMENT
69 y.o. male with PMH of HTN, HLD, Diabetes Mellitus 2, ED, GERD-  presents today for elective cardiac catheterization. Planned for upcoming catheterization found to have history of ASA allergy.     Patient's history of symptoms with ASA is a localized skin rash, he has avoided aspirin since at least 2021, thus is considered allergic to aspirin at this time.    Thorough discussion of risks and benefits of aspirin desensitization were reviewed with patient. Explained that desensitization does not eliminate a true allergy to aspirin and that there is still a risk with the protocol that the patient will develop an IgE mediated reaction to aspirin during the procedure. Also notified patient that once on desensitization protocol, it is a temporary state of lack of reactivity and he must continue to take the aspirin daily to continue tolerance to medication and receive benefit. The discontinuation of drug for will lead to loss of state of unresponsiveness and would require restarting desensitization protocol. Desensitization must take place in a controlled ICU setting with frequent cardiopulmonary monitoring due to risk of anaphylaxis.     Patient is on a beta blocker - counseled patient and primary team that this can exacerbate an anaphylactic reaction and would generally hold this medication for at least 12 hours prior to desensitization in order to minimize risk; shared-decision making based on risks and benefits of delaying aspirin therapy should be discussed with patient.       Would also suggest use of other anti-platelet agents if possible.  If Aspirin is the drug of choice after consideration of risks and benefits, then recommend proceeding in the following way:     Per cardiology, plan for 81mg ASA daily following desensitization.      Patient must be in ICU setting and team who is performing the procedure, must obtain patient consent.    Then can proceed with desensitization per protocol provided by our division.  Protocol is found below.   Patient will need to have appropriate nursing staff during the procedure, monitoring, PRN epinephrine 0.3mg, benadryl and albuterol available for desensitization, among other emergency therapeutics as needed in the ICU.     Because of this, we recommend for this desensitization to be performed during the daytime/AM on 5/15/25 after patient and family have an opportunity to discuss the case with the attending Dr. Glynn.    A copy of the desensitization protocol is below and will be provided to the primary team    Mix one [place] tablet (325 mg ASA) with 325 cc sterile water (=1mg/1cc)						  Total volume: 325 cc				      Dose escalation: every 60 min, each line is next step of dose escalation									  Volume  Dose 	     Cum. Dose 		   		  (mL)	  (mg)	       (mg)		  20 mL	20 mg	     20 mg  20 mL	20 mg	     40 mg							  41 mL	41 mg	     81 mg  						  Patient must be closely monitored for 2 hours for signs of an allergic reaction after final dose is given.									  										  										  Special Instructions:										  *Procedure must be done in ICU or CCU; strongly advise that this should be done during the day with full staffing available.										  *Patient must be consented for this procedure by ICU/CCU team prior to desensitization; risks include anaphylaxis and death										  *Ideally, beta blockers should be held prior to procedure if appropriate, as they can interfere with efficacy of epinephrine										  *In a patient on beta blockers who is not responding to epinephrine, the treatment is glucagon 1-5mg IV over 5 minutes										  *Ideally, ACE inhibitors should also be held 24 hours prior to start of procedure as it has been shown to worsen allergic reactions										  										  *Record vital signs and exam prior to each dose. 										  *Keep patient in a monitored setting for 24 hours.  										  *Patient must be educated that once she/he has been desensitized to aspirin,										  *She/he must continue to take aspirin on a daily basis, or risk becoming resensitized.										  *Once desensitized, patient should not take an aspirin dose exceeding 81mg.										  *If a higher dose is required in the future, please contact Allergy/Immunology for further evaluation.										  										  Treatment of Reactions:										  *If reactions occur, temporarily halt the protocol and treat as appropriate: Benadryl for pruritis/rash, albuterol for wheeze, 										  epinephrine (0.3mg 1:1000) for systemic symptoms.  Obtain a serum tryptase within 1-2 hours of reaction.										  Resume the protocol by repeating the same dose where the reaction occurred.										  *The protocol should be aborted if the patient develops hypotension and/or laryngeal edema that are not immediately responsive to IM epinephrine										  *In case of anaphylaxis, patient should be treated with 0.3 cc 1:1000 Epinephrine IM immediately.										  *Additional cardiorespiratory support as per ICU/CCU team.										  (Symptoms of anaphylaxis include respiratory distress, hypotension, and/or involvement of 2 organ systems)										  Please contact us with any questions: [phone].

## 2025-05-14 NOTE — CHART NOTE - NSCHARTNOTEFT_GEN_A_CORE
Reason for CCU Consult:   Aspirin Desensitization    HISTORY OF PRESENT ILLNESS:  Patient is a 69y old  Male who presents with a chief complaint of atherosclerosis. CCTA calcium score of 1008    Patient is a 69 y.o. male with PMH of HTN, HLD, Diabetes Mellitus 2, ED, GERD-  presents today for elective cardiac catheterization. Referring physician, [place], stress test 11/2023 - normal myocardial perfusion  CT Heart Calcium Score in March of 2025 showed Extensive coronary artery calcification (Agatston score 1008 AU). Today, patient had a cardiac catheterization and was found to have mid RCA 80% occluded. Patient has an aspirin allergy. CCU consulted for admission for aspirin desensitivezation. Right radial band access site to be removed at 8pm.        PAST MEDICAL & SURGICAL HISTORY:  HTN (hypertension), benign  HLD (hyperlipidemia)  DM2 (diabetes mellitus, type 2)  GERD (gastroesophageal reflux disease)  Erectile dysfunction  History of ankle surgery  H/O cataract extraction      MEDICATIONS  (STANDING):  amLODIPine   Tablet 5 milliGRAM(s) Oral daily  atorvastatin 40 milliGRAM(s) Oral at bedtime  carvedilol 12.5 milliGRAM(s) Oral every 12 hours  chlorhexidine 2% Cloths 1 Application(s) Topical <User Schedule>  clopidogrel Tablet 75 milliGRAM(s) Oral daily  dextrose 5%. 1000 milliLiter(s) (100 mL/Hr) IV Continuous <Continuous>  dextrose 5%. 1000 milliLiter(s) (50 mL/Hr) IV Continuous <Continuous>  dextrose 50% Injectable 25 Gram(s) IV Push once  dextrose 50% Injectable 12.5 Gram(s) IV Push once  dextrose 50% Injectable 25 Gram(s) IV Push once  glucagon  Injectable 1 milliGRAM(s) IntraMuscular once  hydrALAZINE 100 milliGRAM(s) Oral <User Schedule>  hydrALAZINE 50 milliGRAM(s) Oral <User Schedule>  insulin lispro (ADMELOG) corrective regimen sliding scale   SubCutaneous three times a day before meals  insulin lispro Injectable (ADMELOG) 22 Unit(s) SubCutaneous before dinner  pantoprazole    Tablet 40 milliGRAM(s) Oral before breakfast  sodium chloride 0.9% lock flush 3 milliLiter(s) IV Push every 8 hours    MEDICATIONS  (PRN):  dextrose Oral Gel 15 Gram(s) Oral once PRN Blood Glucose LESS THAN 70 milliGRAM(s)/deciliter      Drug Dosing Weight  Height (cm): 172.7 (14 May 2025 15:46)  Weight (kg): 92.5 (14 May 2025 15:46)  BMI (kg/m2): 31 (14 May 2025 15:46)  BSA (m2): 2.06 (14 May 2025 15:46)    FAMILY HISTORY:  No pertinent family history in first degree relatives        SOCIAL HISTORY:  Smoker[ ]  Non smoker[ ]  Alcohol [ ]      ADVANCE DIRECTIVES:    CONSTITUTIONAL: No fevers, No chills, No fatigue, No weight gain  EYES: No vision changes   ENT: No congestion, No ear pain, No sore throat.  NECK: No pain, No stiffness  RESPIRATORY: No shortness of breath, No cough, No wheezing, No hemoptysis  CARDIOVASCULAR: No chest pain. No palpitations, No RESENDIZ, No orthopnea, No paroxysmal nocturnal dyspnea, No pleuritic pain  GASTROINTESTINAL: No abdominal pain, No nausea, No vomiting, No hematemesis, No diarrhea No constipation. No melena  GENITOURINARY: No dysuria, No frequency, No incontinence, No hematuria  NEUROLOGICAL: No dizziness, No lightheadedness, No syncope, No LOC, No headache, No numbness or weakness  MUSCULOSKELETAL: No Edema, No joint pain, No joint swelling.  PSYCHIATRIC: No anxiety, No depression  DERMATOLOGY: No diaphoresis. No itching, No rashes, No pressure ulcers  HEME/LYMPH: No easy bruising, or bleeding gums    All other review of systems is negative unless indicated above.    Appearance: NAD, no distress  HEENT: Moist Mucous Membranes, Anicteric, PERRL, EOMI  Cardiovascular: Regular rate and rhythm, Normal S1 S2, No JVD, No murmurs  Respiratory: Lungs clear to auscultation. No rales, No rhonchi, No wheezing. No tenderness to palpation  Gastrointestinal:  Soft, Non-tender, + BS  Neurologic: Non-focal, A&Ox3  Skin: Warm and dry, No rashes, No ecchymosis, No cyanosis  Musculoskeletal: No clubbing, No cyanosis, No edema  Vascular: Peripheral pulses palpable 2+ bilaterally  Psychiatry: Mood & affect appropriate      ICU Vital Signs Last 24 Hrs  T(C): 36.9 (14 May 2025 18:20), Max: 37 (14 May 2025 15:46)  T(F): 98.4 (14 May 2025 18:20), Max: 98.6 (14 May 2025 15:46)  HR: 76 (14 May 2025 18:30) (67 - 76)  BP: 144/63 (14 May 2025 18:30) (144/63 - 168/71)  BP(mean): --  ABP: --  ABP(mean): --  RR: 16 (14 May 2025 18:30) (16 - 16)  SpO2: 98% (14 May 2025 18:30) (98% - 98%)      LABS:  CBC Full  -  ( 14 May 2025 15:35 )  WBC Count : 7.78 K/uL  RBC Count : 4.87 M/uL  Hemoglobin : 13.7 g/dL  Hematocrit : 43.3 %  Platelet Count - Automated : 192 K/uL  Mean Cell Volume : 88.9 fL  Mean Cell Hemoglobin : 28.1 pg  Mean Cell Hemoglobin Concentration : 31.6 g/dL  Auto Neutrophil # : x  Auto Lymphocyte # : x  Auto Monocyte # : x  Auto Eosinophil # : x  Auto Basophil # : x  Auto Neutrophil % : x  Auto Lymphocyte % : x  Auto Monocyte % : x  Auto Eosinophil % : x  Auto Basophil % : x    05-14    134[L]  |  96[L]  |  14  ----------------------------<  160[H]  4.0   |  26  |  0.82    Ca    9.2      14 May 2025 15:35          CAPILLARY BLOOD GLUCOSE      POCT Blood Glucose.: 148 mg/dL (14 May 2025 15:41)      Urinalysis Basic - ( 14 May 2025 15:35 )    Color: x / Appearance: x / SG: x / pH: x  Gluc: 160 mg/dL / Ketone: x  / Bili: x / Urobili: x   Blood: x / Protein: x / Nitrite: x   Leuk Esterase: x / RBC: x / WBC x   Sq Epi: x / Non Sq Epi: x / Bacteria: x    ASSESSMENT:  69M with PMH of HTN, HLD, Diabetes Mellitus 2, ED, GERD presented to [facility] for elective cardiac catheterization found to have an 80% mid RCA lesion. Patient presenting to the CCU for aspirin desensitization prior to the revascularization procedure.    PLAN:  -Admit to the CCU  -Allergy and immunology to consult  -Will initiate aspirin desensitization protocol in the am    NEURO:  ALert and oriented x 3    RESPIRATORY:  No issues    CARDIAC:  CAD:  s/p LHC  -remove Right radial band at 8pm  -continue plavix  -continue lipitor    HTN:  Continue coreg, hydral and amlodipine    GI:  Continue protonix for GERD    :  Normal renal indices    ENDO:  DM:  Takes 28 units of lantus at night  fingersticks QAC and QHS    VTE:  HSQ after band comes off (Start in am)    Disposition   -Plan is to go home tomorrow after aspirin desensitization and to come back next week for cardiac cath with revascularization/stenting

## 2025-05-14 NOTE — ASU PATIENT PROFILE, ADULT - FALL HARM RISK - UNIVERSAL INTERVENTIONS
[facility]  Cardiology Progress Note        Ariane Kuo, female  : 1940  PCP: Verify Pcp  Attending/Consulting Provider: Jacki Davis MD       SUBJECTIVE:   No acute event overnight.  Doing well and denies chest pain, shortness of breath.    Review of Systems:  Review of Systems   Constitution: Negative.   HENT: Negative.    Eyes: Negative.    Endocrine: Negative.    Hematologic/Lymphatic: Negative.    Skin: Negative.    Musculoskeletal: Negative.    Gastrointestinal: Negative.    Genitourinary: Negative.    Neurological: Negative.    Psychiatric/Behavioral: Negative.    Allergic/Immunologic: Negative.        Medications:  Prior to Admission medications    Medication Sig Start Date End Date Taking? Authorizing Provider   metFORMIN (GLUCOPHAGE) 500 MG tablet Take 500 mg by mouth 3 times daily. 20  Yes Outside Provider   amLODIPine (NORVASC) 10 MG tablet TAKE 1 TABLET BY MOUTH DAILY IN THE MORNING 20   Outside Provider   Alphagan P 0.15 % ophthalmic solution 1 DROP INTO EACH EYE AT BEDTIME 21   Outside Provider   cloNIDine (CATAPRES) 0.1 MG tablet Take 0.1 mg by mouth daily. 20   Outside Provider   Jardiance 25 MG tablet Take 25 mg by mouth daily. 20   Outside Provider   Vitamin D, Ergocalciferol, 1.25 mg (50,000 units) capsule TAKE 1 CAPSULE BY MOUTH ONCE WEEKLY AS DIRECTED 20   Outside Provider   FLUoxetine (PROzac) 20 MG capsule Take 20 mg by mouth daily. 20   Outside Provider   hydrALAZINE (APRESOLINE) 10 MG tablet Take 10 mg by mouth daily. 20   Outside Provider   Sure Comfort Lancets 30G Misc USE AS DIRECTED 1 TIME A DAY 20   Outside Provider   hydrochlorothiazide (HYDRODIURIL) 25 MG tablet Take 25 mg by mouth daily. 10/30/20   Outside Provider   metoPROLOL tartrate (LOPRESSOR) 25 MG tablet Take 25 mg by mouth 2 times daily. 21   Outside Provider   omeprazole (PrilOSEC) 20 MG capsule Take 20 mg by mouth every morning. 20    Outside Provider   olmesartan (BENICAR) 40 MG tablet TAKE 1 TABLET BY MOUTH DAILY IN THE MORNING 12/28/20   Outside Provider   Travatan Z 0.004 % ophthalmic solution 1 DROP INTO EACH EYE EVERY DAY AT BEDTIME 1/8/21   Outside Provider   rosuvastatin (CRESTOR) 10 MG tablet TAKE 1 TABLET BY MOUTH DAILY IN THE EVENING 12/1/20   Outside Provider   Glucose Blood (GLUCONAVII BLOOD GLUCOSE TEST VI) USE AS DIRECTED 1 TIME A DAY 11/4/20   Outside Provider       Current Facility-Administered Medications   Medication Dose Route Frequency Provider Last Rate Last Admin   • carvedilol (COREG) tablet 6.25 mg  6.25 mg Oral 2 times per day Lenin Lucio MD   6.25 mg at 01/11/21 2111   • isosorbide mononitrate (IMDUR) ER tablet 30 mg  30 mg Oral Daily Lenin Lucio MD   30 mg at 01/11/21 1020   • heparin (porcine) injection 5,000 Units  5,000 Units Subcutaneous 2 times per day Beka Childress MD   5,000 Units at 01/11/21 2211   • aspirin chewable 81 mg  81 mg Oral Daily Lashae Blevins, DO   81 mg at 01/11/21 1020   • atorvastatin (LIPITOR) tablet 40 mg  40 mg Oral Nightly Lashae Blevins, DO   40 mg at 01/11/21 2111   • fentaNYL (SUBLIMAZE) injection 25 mcg  25 mcg Intravenous Once Lashae Blevins, DO       • sodium chloride 0.9 % flush bag 25 mL  25 mL Intravenous PRN Tahir López MD       • sodium chloride (PF) 0.9 % injection 2 mL  2 mL Intracatheter 2 times per day Tahir López MD   2 mL at 01/11/21 2120   • sodium chloride 0.9% infusion   Intravenous Continuous PRN Tahir López MD       • sodium chloride 0.9% infusion   Intravenous Continuous PRN Tahir López MD       • sodium chloride (NORMAL SALINE) 0.9 % bolus 500 mL  500 mL Intravenous PRN Tahir López MD       • dextrose 50 % injection 25 g  25 g Intravenous PRN Tahir óLpez MD       • dextrose 50 % injection 12.5 g  12.5 g Intravenous PRN Tahir López MD       • glucagon (GLUCAGEN) injection 1 mg  1 mg Intramuscular PRN Tahir López MD       • dextrose (GLUTOSE)  40 % gel 15 g  15 g Oral PRN Tahir López MD       • dextrose (GLUTOSE) 40 % gel 30 g  30 g Oral PRN Tahir López MD       • insulin lispro (HumaLOG) scheduled dose AND correction dose   Subcutaneous TID WC Tahir López MD   2 Units at 01/11/21 1411   • ticagrelor (BRILINTA) tablet 90 mg  90 mg Oral 2 times per day Lashae Blevins, DO   90 mg at 01/11/21 2211   • pantoprazole (PROTONIX) EC tablet 40 mg  40 mg Oral QAM AC Tahir López MD   40 mg at 01/12/21 0525   • latanoprost (XALATAN) 0.005 % ophthalmic solution 1 drop  1 drop Both Eyes Nightly Tahir López MD       • brimonidine (ALPHAGAN) 0.2 % ophthalmic solution 1 drop  1 drop Both Eyes QHS Tahir López MD       • FLUoxetine (PROzac) capsule 20 mg  20 mg Oral Daily Tahir López MD   20 mg at 01/11/21 1020         OBJECTIVE:       Vital Last Value 24 Hour Range   Temperature 97.3 °F (36.3 °C) (01/12/21 0400) Temp  Min: 96.8 °F (36 °C)  Max: 97.7 °F (36.5 °C)   Pulse 80 (01/12/21 0600) Pulse  Min: 72  Max: 93   Respiratory 16 (01/12/21 0600) Resp  Min: 15  Max: 21   Non-Invasive  Blood Pressure (!) 178/76 (01/12/21 0600) BP  Min: 132/88  Max: 180/76   Pulse Oximetry 99 % (01/12/21 0600) SpO2  Min: 94 %  Max: 99 %   Arterial   Blood Pressure   No data recorded        Intake/Output Summary (Last 24 hours) at 1/12/2021 0843  Last data filed at 1/11/2021 1400  Gross per 24 hour   Intake 240 ml   Output 900 ml   Net -660 ml        Wt Readings from Last 3 Encounters:   01/12/21 48.5 kg (106 lb 14.8 oz)           Tele: no evidence of arrythmia on tele    PHYSICAL EXAMINATION:  Physical Exam  Constitutional:       Appearance: She is well-developed.   Eyes:      Conjunctiva/sclera: Conjunctivae normal.      Pupils: Pupils are equal, round, and reactive to light.   Neck:      Thyroid: No thyromegaly.      Vascular: No JVD.   Cardiovascular:      Rate and Rhythm: Normal rate and regular rhythm.  No extrasystoles are present.     Pulses:           Carotid pulses are 2+ on  the right side and 2+ on the left side.       Radial pulses are 2+ on the right side and 2+ on the left side.        Femoral pulses are 2+ on the right side and 2+ on the left side.       Popliteal pulses are 2+ on the right side and 2+ on the left side.        Dorsalis pedis pulses are 2+ on the right side and 2+ on the left side.        Posterior tibial pulses are 2+ on the right side and 2+ on the left side.      Heart sounds: No murmur. No friction rub. No gallop. No S3 or S4 sounds.    Pulmonary:      Effort: Pulmonary effort is normal. No respiratory distress.      Breath sounds: Examination of the right-lower field reveals decreased breath sounds. Examination of the left-lower field reveals decreased breath sounds. Decreased breath sounds present. No wheezing or rales.   Chest:      Chest wall: No tenderness.   Abdominal:      General: Bowel sounds are normal. There is no distension.      Palpations: Abdomen is soft. There is no mass.      Tenderness: There is no abdominal tenderness.   Musculoskeletal: Normal range of motion.   Skin:     Coloration: Skin is not pale.      Findings: No rash.   Neurological:      Coordination: Coordination normal.   Psychiatric:         Behavior: Behavior normal.           DIAGNOSTIC STUDIES:     Labs:  CBC:   Recent Labs   Lab 01/12/21  0513 01/11/21  0311 01/10/21  1135   WBC 4.9 6.0 5.7   RBC 3.91* 4.02 3.83*   HGB 10.5* 10.7* 10.3*   HCT 32.8* 34.6* 33.3*   MCV 83.9 86.1 86.9   MCHC 32.0 30.9* 30.9*   RDW-CV 15.4* 15.2* 15.0    255 210   Lymphocytes, Percent 21 11 25     CMP:  Recent Labs   Lab 01/12/21  0513 01/11/21  0311 01/10/21  1522 01/10/21  1135   SODIUM 140 139 139 140   POTASSIUM 3.7 4.3 5.1 4.2   CHLORIDE 108* 110* 112* 110*   CO2 20* 15* 15* 17*   BUN 43* 52* 59* 59*   CREATININE 2.59* 2.46* 2.64* 2.80*   GLUCOSE 175* 146* 285* 203*   ALBUMIN 3.4* 3.7  --  3.8   PHOS 3.6 4.5  --   --    AST 41* 91*  --  5   GPT 15 17  --  13   ALKPT 74 81  --  79    BILIRUBIN 0.3 0.3  --  0.3   MG 1.8 1.4*  --   --      COAGULATION STUDIES:   Recent Labs   Lab 01/10/21  1134   PT 10.7   PTT 26   INR 1.0     TSH:  No results found for: TSH  HbA1c: No results found for: HGBA1C  LIPID PANEL:   Recent Labs   Lab 01/10/21  1522   CHOLESTEROL 121   TRIGLYCERIDE 94   HDL 47*   CALCLDL 55     NT-PRONBP: No results for input(s): NTPROB in the last 8765 hours.  Troponin:   Recent Labs   Lab 01/11/21  1407 01/11/21  0311 01/10/21  1135   RAPDTR 25.50* 51.90* 0.09*       Data: Inferior STEMI  S/p cardiac cath   LM: dist 60% stenosis  LAD: mid 70% stenosis  LCx: ostial 80% stenosis. OM1 80% stenosis  RCA: mid 60% stenosis. RPDA 90% stenosis. 100% acute occlusion of RPL  LVEDP: 17 mmHg    ASSESSMENT AND PLAN:     Impression: This is a 80 year oldfemale presented with chest pain. Found to have ST elevation in II, III w/ ST depression in I & aVL. Cath alert called.     Assessment:   1. Inferior STEMI s/p PCI to RPL  2. CKD  3. HTN  4. HLD  5. DM  6. CAD    PLAN:  EKG shows inferior TWI.  Echo showed EF 70% PSAP 46 mmHg  Patient has significant residual coronary artery disease.  But patient is at high risk of dialysis with CKD 4.  Patient is also not amenable to any further interventions.  Therefore at this time we will aggressively medically manage this patient and consider any intervention in the future if patient has recurrent and refractory symptoms despite maximal medical therapy.  Continue dual antiplatelet therapy with ASA 81 mg and Ticagrelor 90 mg BID  Continue high intensity statin with Lipitor 40 mg qd.   Continue Coreg 6.25 mg BID  Continue Imdur 30 mg daily.  Can uptitrate doses if needed if patient has recurrent chest pain.  Ranolazine is also an option for the future.  Cardiac rehab  Counseled about medication compliance. Counseled about dietary and lifestyle modifications via .   Okay to discharge home today.   Follow up with Dr. Garvey in 1-2 weeks from discharge.      Edwin Coker MD  Cardiology Fellow     Bed in lowest position, wheels locked, appropriate side rails in place/Call bell, personal items and telephone in reach/Instruct patient to call for assistance before getting out of bed or chair/Non-slip footwear when patient is out of bed/[place] to call system/Physically safe environment - no spills, clutter or unnecessary equipment/Purposeful Proactive Rounding/Room/bathroom lighting operational, light cord in reach

## 2025-05-14 NOTE — CHART NOTE - NSCHARTNOTEFT_GEN_A_CORE
Removal of Radial Band    Pulses in the right upper extremity are palpable. The patient was placed in the supine position. The insertion site was identified and the band deflated per protocol. The radial band was removed slowly. The site has no active bleeding or hematoma. Positive palpable right radial pulse and <3 sec capillary refill present. Patient tolerated well and denied pain, tingling or numbness. Vital signs stable. Continue monitoring as per protocol.        Direct pressure was applied for 15 minutes/not applicable.      Monitoring of the right wrists and both upper extremities including neuro-vascular checks and vital signs every 15 minutes x 4.    Complications: None

## 2025-05-14 NOTE — PATIENT PROFILE ADULT - FALL HARM RISK - HARM RISK INTERVENTIONS

## 2025-05-15 ENCOUNTER — TRANSCRIPTION ENCOUNTER (OUTPATIENT)
Age: 70
End: 2025-05-15

## 2025-05-15 DIAGNOSIS — I50.9 HEART FAILURE, UNSPECIFIED: ICD-10-CM

## 2025-05-15 LAB
A1C WITH ESTIMATED AVERAGE GLUCOSE RESULT: 8 % — HIGH (ref 4–5.6)
ALBUMIN SERPL ELPH-MCNC: 3.7 G/DL — SIGNIFICANT CHANGE UP (ref 3.3–5)
ALP SERPL-CCNC: 57 U/L — SIGNIFICANT CHANGE UP (ref 40–120)
ALT FLD-CCNC: 14 U/L — SIGNIFICANT CHANGE UP (ref 4–41)
ANION GAP SERPL CALC-SCNC: 12 MMOL/L — SIGNIFICANT CHANGE UP (ref 7–14)
AST SERPL-CCNC: 15 U/L — SIGNIFICANT CHANGE UP (ref 4–40)
BASOPHILS # BLD AUTO: 0.05 K/UL — SIGNIFICANT CHANGE UP (ref 0–0.2)
BASOPHILS NFR BLD AUTO: 0.8 % — SIGNIFICANT CHANGE UP (ref 0–2)
BILIRUB SERPL-MCNC: 0.4 MG/DL — SIGNIFICANT CHANGE UP (ref 0.2–1.2)
BUN SERPL-MCNC: 14 MG/DL — SIGNIFICANT CHANGE UP (ref 7–23)
CALCIUM SERPL-MCNC: 9 MG/DL — SIGNIFICANT CHANGE UP (ref 8.4–10.5)
CHLORIDE SERPL-SCNC: 97 MMOL/L — LOW (ref 98–107)
CHOLEST SERPL-MCNC: 118 MG/DL — SIGNIFICANT CHANGE UP
CO2 SERPL-SCNC: 24 MMOL/L — SIGNIFICANT CHANGE UP (ref 22–31)
CREAT SERPL-MCNC: 0.83 MG/DL — SIGNIFICANT CHANGE UP (ref 0.5–1.3)
EGFR: 94 ML/MIN/1.73M2 — SIGNIFICANT CHANGE UP
EGFR: 94 ML/MIN/1.73M2 — SIGNIFICANT CHANGE UP
EOSINOPHIL # BLD AUTO: 0.22 K/UL — SIGNIFICANT CHANGE UP (ref 0–0.5)
EOSINOPHIL NFR BLD AUTO: 3.3 % — SIGNIFICANT CHANGE UP (ref 0–6)
ESTIMATED AVERAGE GLUCOSE: 183 — SIGNIFICANT CHANGE UP
GLUCOSE BLDC GLUCOMTR-MCNC: 146 MG/DL — HIGH (ref 70–99)
GLUCOSE BLDC GLUCOMTR-MCNC: 180 MG/DL — HIGH (ref 70–99)
GLUCOSE BLDC GLUCOMTR-MCNC: 187 MG/DL — HIGH (ref 70–99)
GLUCOSE BLDC GLUCOMTR-MCNC: 200 MG/DL — HIGH (ref 70–99)
GLUCOSE SERPL-MCNC: 165 MG/DL — HIGH (ref 70–99)
HCT VFR BLD CALC: 44.6 % — SIGNIFICANT CHANGE UP (ref 39–50)
HDLC SERPL-MCNC: 40 MG/DL — LOW
HGB BLD-MCNC: 14.4 G/DL — SIGNIFICANT CHANGE UP (ref 13–17)
IANC: 3.69 K/UL — SIGNIFICANT CHANGE UP (ref 1.8–7.4)
IMM GRANULOCYTES NFR BLD AUTO: 0.3 % — SIGNIFICANT CHANGE UP (ref 0–0.9)
LDLC SERPL-MCNC: 65 MG/DL — SIGNIFICANT CHANGE UP
LIPID PNL WITH DIRECT LDL SERPL: 65 MG/DL — SIGNIFICANT CHANGE UP
LYMPHOCYTES # BLD AUTO: 1.94 K/UL — SIGNIFICANT CHANGE UP (ref 1–3.3)
LYMPHOCYTES # BLD AUTO: 29.2 % — SIGNIFICANT CHANGE UP (ref 13–44)
MAGNESIUM SERPL-MCNC: 2.1 MG/DL — SIGNIFICANT CHANGE UP (ref 1.6–2.6)
MCHC RBC-ENTMCNC: 28 PG — SIGNIFICANT CHANGE UP (ref 27–34)
MCHC RBC-ENTMCNC: 32.3 G/DL — SIGNIFICANT CHANGE UP (ref 32–36)
MCV RBC AUTO: 86.8 FL — SIGNIFICANT CHANGE UP (ref 80–100)
MONOCYTES # BLD AUTO: 0.72 K/UL — SIGNIFICANT CHANGE UP (ref 0–0.9)
MONOCYTES NFR BLD AUTO: 10.8 % — SIGNIFICANT CHANGE UP (ref 2–14)
MRSA PCR RESULT.: SIGNIFICANT CHANGE UP
NEUTROPHILS # BLD AUTO: 3.69 K/UL — SIGNIFICANT CHANGE UP (ref 1.8–7.4)
NEUTROPHILS NFR BLD AUTO: 55.6 % — SIGNIFICANT CHANGE UP (ref 43–77)
NONHDLC SERPL-MCNC: 78 MG/DL — SIGNIFICANT CHANGE UP
NRBC # BLD AUTO: 0 K/UL — SIGNIFICANT CHANGE UP (ref 0–0)
NRBC # FLD: 0 K/UL — SIGNIFICANT CHANGE UP (ref 0–0)
NRBC BLD AUTO-RTO: 0 /100 WBCS — SIGNIFICANT CHANGE UP (ref 0–0)
PHOSPHATE SERPL-MCNC: 3.6 MG/DL — SIGNIFICANT CHANGE UP (ref 2.5–4.5)
PLATELET # BLD AUTO: 204 K/UL — SIGNIFICANT CHANGE UP (ref 150–400)
POTASSIUM SERPL-MCNC: 4.2 MMOL/L — SIGNIFICANT CHANGE UP (ref 3.5–5.3)
POTASSIUM SERPL-SCNC: 4.2 MMOL/L — SIGNIFICANT CHANGE UP (ref 3.5–5.3)
PROT SERPL-MCNC: 6.5 G/DL — SIGNIFICANT CHANGE UP (ref 6–8.3)
RBC # BLD: 5.14 M/UL — SIGNIFICANT CHANGE UP (ref 4.2–5.8)
RBC # FLD: 12.7 % — SIGNIFICANT CHANGE UP (ref 10.3–14.5)
S AUREUS DNA NOSE QL NAA+PROBE: SIGNIFICANT CHANGE UP
SODIUM SERPL-SCNC: 133 MMOL/L — LOW (ref 135–145)
TRIGL SERPL-MCNC: 62 MG/DL — SIGNIFICANT CHANGE UP
TSH SERPL-MCNC: 1.15 UIU/ML — SIGNIFICANT CHANGE UP (ref 0.27–4.2)
WBC # BLD: 6.64 K/UL — SIGNIFICANT CHANGE UP (ref 3.8–10.5)
WBC # FLD AUTO: 6.64 K/UL — SIGNIFICANT CHANGE UP (ref 3.8–10.5)

## 2025-05-15 PROCEDURE — 99291 CRITICAL CARE FIRST HOUR: CPT

## 2025-05-15 RX ORDER — ASPIRIN 325 MG
81 TABLET ORAL DAILY
Refills: 0 | Status: DISCONTINUED | OUTPATIENT
Start: 2025-05-16 | End: 2025-05-16

## 2025-05-15 RX ORDER — INSULIN LISPRO 100 U/ML
10 INJECTION, SOLUTION INTRAVENOUS; SUBCUTANEOUS
Refills: 0 | Status: DISCONTINUED | OUTPATIENT
Start: 2025-05-16 | End: 2025-05-16

## 2025-05-15 RX ORDER — SIMETHICONE 80 MG
80 TABLET,CHEWABLE ORAL ONCE
Refills: 0 | Status: COMPLETED | OUTPATIENT
Start: 2025-05-15 | End: 2025-05-15

## 2025-05-15 RX ORDER — MAGNESIUM, ALUMINUM HYDROXIDE 200-200 MG
30 TABLET,CHEWABLE ORAL EVERY 6 HOURS
Refills: 0 | Status: DISCONTINUED | OUTPATIENT
Start: 2025-05-15 | End: 2025-05-16

## 2025-05-15 RX ORDER — ASPIRIN 325 MG
325 TABLET ORAL ONCE
Refills: 0 | Status: DISCONTINUED | OUTPATIENT
Start: 2025-05-15 | End: 2025-05-15

## 2025-05-15 RX ORDER — DIPHENHYDRAMINE HCL 12.5MG/5ML
50 ELIXIR ORAL ONCE
Refills: 0 | Status: COMPLETED | OUTPATIENT
Start: 2025-05-15 | End: 2025-05-15

## 2025-05-15 RX ORDER — INSULIN LISPRO 100 U/ML
10 INJECTION, SOLUTION INTRAVENOUS; SUBCUTANEOUS ONCE
Refills: 0 | Status: COMPLETED | OUTPATIENT
Start: 2025-05-15 | End: 2025-05-15

## 2025-05-15 RX ORDER — INSULIN LISPRO 100 U/ML
10 INJECTION, SOLUTION INTRAVENOUS; SUBCUTANEOUS
Refills: 0 | Status: DISCONTINUED | OUTPATIENT
Start: 2025-05-15 | End: 2025-05-16

## 2025-05-15 RX ORDER — AMLODIPINE BESYLATE 10 MG/1
5 TABLET ORAL DAILY
Refills: 0 | Status: DISCONTINUED | OUTPATIENT
Start: 2025-05-15 | End: 2025-05-16

## 2025-05-15 RX ORDER — HEPARIN SODIUM 1000 [USP'U]/ML
5000 INJECTION INTRAVENOUS; SUBCUTANEOUS EVERY 8 HOURS
Refills: 0 | Status: DISCONTINUED | OUTPATIENT
Start: 2025-05-15 | End: 2025-05-16

## 2025-05-15 RX ORDER — IPRATROPIUM BROMIDE AND ALBUTEROL SULFATE .5; 2.5 MG/3ML; MG/3ML
3 SOLUTION RESPIRATORY (INHALATION) ONCE
Refills: 0 | Status: DISCONTINUED | OUTPATIENT
Start: 2025-05-15 | End: 2025-05-16

## 2025-05-15 RX ADMIN — INSULIN GLARGINE-YFGN 14 UNIT(S): 100 INJECTION, SOLUTION SUBCUTANEOUS at 21:38

## 2025-05-15 RX ADMIN — Medication 80 MILLIGRAM(S): at 07:58

## 2025-05-15 RX ADMIN — ATORVASTATIN CALCIUM 40 MILLIGRAM(S): 80 TABLET, FILM COATED ORAL at 21:38

## 2025-05-15 RX ADMIN — Medication 100 MILLIGRAM(S): at 05:35

## 2025-05-15 RX ADMIN — INSULIN LISPRO 1: 100 INJECTION, SOLUTION INTRAVENOUS; SUBCUTANEOUS at 11:41

## 2025-05-15 RX ADMIN — Medication 3 MILLILITER(S): at 12:39

## 2025-05-15 RX ADMIN — INSULIN LISPRO 1: 100 INJECTION, SOLUTION INTRAVENOUS; SUBCUTANEOUS at 08:50

## 2025-05-15 RX ADMIN — Medication 3 MILLILITER(S): at 05:22

## 2025-05-15 RX ADMIN — HEPARIN SODIUM 5000 UNIT(S): 1000 INJECTION INTRAVENOUS; SUBCUTANEOUS at 11:40

## 2025-05-15 RX ADMIN — Medication 50 MILLIGRAM(S): at 18:01

## 2025-05-15 RX ADMIN — INSULIN LISPRO 10 UNIT(S): 100 INJECTION, SOLUTION INTRAVENOUS; SUBCUTANEOUS at 18:00

## 2025-05-15 RX ADMIN — Medication 40 MILLIGRAM(S): at 05:35

## 2025-05-15 RX ADMIN — CLOPIDOGREL BISULFATE 75 MILLIGRAM(S): 75 TABLET, FILM COATED ORAL at 11:39

## 2025-05-15 RX ADMIN — Medication 30 MILLILITER(S): at 23:13

## 2025-05-15 RX ADMIN — Medication 3 MILLILITER(S): at 21:07

## 2025-05-15 RX ADMIN — LINACLOTIDE 145 MICROGRAM(S): 290 CAPSULE, GELATIN COATED ORAL at 12:31

## 2025-05-15 RX ADMIN — Medication 80 MILLIGRAM(S): at 21:38

## 2025-05-15 RX ADMIN — HEPARIN SODIUM 5000 UNIT(S): 1000 INJECTION INTRAVENOUS; SUBCUTANEOUS at 21:38

## 2025-05-15 RX ADMIN — Medication 1 APPLICATION(S): at 05:35

## 2025-05-15 RX ADMIN — Medication 50 MILLIGRAM(S): at 23:13

## 2025-05-15 RX ADMIN — INSULIN LISPRO 1: 100 INJECTION, SOLUTION INTRAVENOUS; SUBCUTANEOUS at 11:38

## 2025-05-15 RX ADMIN — INSULIN LISPRO 0: 100 INJECTION, SOLUTION INTRAVENOUS; SUBCUTANEOUS at 18:00

## 2025-05-15 RX ADMIN — AMLODIPINE BESYLATE 5 MILLIGRAM(S): 10 TABLET ORAL at 18:03

## 2025-05-15 RX ADMIN — INSULIN LISPRO 10 UNIT(S): 100 INJECTION, SOLUTION INTRAVENOUS; SUBCUTANEOUS at 12:45

## 2025-05-15 NOTE — DISCHARGE NOTE PROVIDER - HOSPITAL COURSE
69 y.o. male with PMH of HTN, HLD, T2DM, ED, GERD-  presents today for elective cardiac catheterization.  The patient denies chest pain, SOB, palpitations, presyncope, syncope,  headache, visual disturbances, CVA, PE, DVT, [place], abdominal pain, N/V/D,  hematochezia, melena, dysuria, hematuria, fever, chills. He admits to constipation and epigastric abdominal discomfort for many years. As per patient, he was seen by a gastroenterologist, was told that there is no GI pathology. The patient was evaluated by a cardiologist, was found to have abnormal CT calcium score test.  Due to patient's symptoms and abnormal non invasive test results,  the patient  was recommended to have cardiac catheterization. The patient denies any complaints at present. Dr Deras, outpatient cardiologist referred the patient for elective angiogram, Stress test 11/2023 with normal myocardial perfusion. On 5/14 s/p diagnostic LHC shows mRCA 80%. Plan for ASA desensitization in CCU. 5/15 ASA desensitization started.         referring physician, [place]  stress test 11/2023 - normal myocardial perfusion  CT Heart Calcium Score (03.14.25 @ 08:15) >  1.  Extensive coronary artery calcification (Agatston score 1008 AU) as   delineated above.  2.  The observed calcium score of 1008 is at percentile 94 for   individuals of the same age, gender, and race/ethnicity who are free of   clinical cardiovascular disease and treated diabetes ([place] [employer] database).       69 y.o. male with PMH of HTN, HLD, T2DM, ED, GERD-  presents today for elective cardiac catheterization.  The patient denies chest pain, SOB, palpitations, presyncope, syncope,  headache, visual disturbances, CVA, PE, DVT, [place], abdominal pain, N/V/D,  hematochezia, melena, dysuria, hematuria, fever, chills. He admits to constipation and epigastric abdominal discomfort for many years. As per patient, he was seen by a gastroenterologist, was told that there is no GI pathology. The patient was evaluated by a cardiologist, was found to have abnormal CT calcium score test.  Due to patient's symptoms and abnormal non invasive test results,  the patient  was recommended to have cardiac catheterization. The patient denies any complaints at present. Dr Deras, outpatient cardiologist referred the patient for elective angiogram, Stress test 11/2023 with normal myocardial perfusion. On 5/14 s/p diagnostic LHC shows mRCA 80%. Patient admitted to CCU for aspirin desensitization. He tolerated aspirin desensitization and can safely proceed with stent procedure scheduled for next week.  Patient will follow up with Dr. Lopez Deras.

## 2025-05-15 NOTE — DISCHARGE NOTE PROVIDER - NSDCCPTREATMENT_GEN_ALL_CORE_FT
PRINCIPAL PROCEDURE  Procedure: Left heart cardiac cath  Findings and Treatment: Found to have mRCA 80%

## 2025-05-15 NOTE — PROGRESS NOTE ADULT - CRITICAL CARE ATTENDING COMMENT
69 year old man with LHC that showed 80% RCA; awaiting ASA desensitization in anticipation of PCI    MEDICATIONS  (STANDING):  Atorvastatin 40 mg daily  Plavix 75 mg daily  Hydralazine 100 mg Am  Hydralazine 50 mg PM  Insulin  Linaclotide 145 mg QD  Pantoprazole 40 mg daily    -ASA densensitization per Allergy team

## 2025-05-15 NOTE — PROGRESS NOTE ADULT - ASSESSMENT
69M with PMH of HTN, HLD, Diabetes Mellitus 2, ED, GERD presented to [facility] for elective cardiac catheterization found to have an 80% mid RCA lesion. Patient presenting to the CCU for aspirin desensitization prior to the revascularization procedure.    PLAN:  -Admit to the CCU  -Allergy and immunology to consult  -Will initiate aspirin desensitization protocol in the am    NEURO:  ALert and oriented x 3    RESPIRATORY:  No issues    CARDIAC:  CAD:  s/p LHC  -remove Right radial band at 8pm  -continue plavix  -continue lipitor    HTN:  Continue coreg, hydral and amlodipine    GI:  Continue protonix for GERD    :  Normal renal indices    ENDO:  DM:  Takes 28 units of lantus at night  fingersticks QAC and QHS    VTE:  HSQ after band comes off (Start in am)    Disposition   -Plan is to go home tomorrow after aspirin desensitization and to come back next week for cardiac cath with revascularization/stenting. 69M with PMH of HTN, HLD, T2DM, ED, GERD presented to [facility] for elective cardiac catheterization found to have an 80% mid RCA lesion. Patient presenting to the CCU for aspirin desensitization prior to the revascularization procedure.    PLAN:  NEURO:  ALert and oriented x 3    RESPIRATORY:  No issues    CARDIAC:  #CAD  s/p LHC shows mRCA 80%  -right rad band site c/d/i  -continue plavix  -continue lipitor      #HTN  hold coreg for ASA desensitization  c/w hydral and amlodipine      GI:  #GERD  -Continue protonix   -simethicone X1    :  Normal renal indices    ENDO:  #T2DM  -home: 28 units of lantus at night  -fingersticks QAC and QHS  -lantus 14u qhs    VTE:  HSQ after band comes off (Start in am)    Disposition   -Plan to get cardiac cath with revascularization/stenting as an outpatient. 69M with PMH of HTN, HLD, T2DM, ED, GERD presented to [facility] for elective cardiac catheterization found to have an 80% mid RCA lesion. Patient presenting to the CCU for aspirin desensitization prior to the revascularization procedure.    PLAN:  NEURO:  ALert and oriented x 3    RESPIRATORY:  No issues    CARDIAC:  #CAD  s/p LHC shows mRCA 80%  -right rad band site c/d/i  -continue plavix  -continue lipitor      #HTN  hold coreg for ASA desensitization  c/w hydral  -hold amlodipine      GI:  #GERD  -Continue protonix   -simethicone X1    :  Normal renal indices    ENDO:  #T2DM  -home: 28 units of lantus at night  -fingersticks QAC and QHS  -lantus 14u qhs    VTE:  HSQ after band comes off (Start in am)    Disposition   -Plan to get cardiac cath with revascularization/stenting as an outpatient. 69M with PMH of HTN, HLD, T2DM, ED, GERD presented to [facility] for elective cardiac catheterization found to have an 80% mid RCA lesion. Patient presenting to the CCU for aspirin desensitization prior to the revascularization procedure.    PLAN:  NEURO:  ALert and oriented x 3    RESPIRATORY:  No issues    CARDIAC:  #CAD  s/p LHC shows mRCA 80%  -right rad band site c/d/i  -continue plavix  -continue lipitor      #HTN  hold coreg for ASA desensitization  c/w hydral  -hold amlodipine      GI:  #GERD  -Continue protonix   -simethicone X1    :  #hyponatremia  -134-->133  -continue to monitor  -Normal renal indices    ENDO:  #T2DM  -home: 28 units of lantus at night  -fingersticks QAC and QHS  -lantus 14u qhs    VTE:  HSQ after band comes off (Start in am)    Disposition   -Plan to get cardiac cath with revascularization/stenting as an outpatient.

## 2025-05-15 NOTE — PROGRESS NOTE ADULT - TIME BILLING
review of hospital notes, consults, labs, imaging; coordination of care; discussion with CCU staff; d/w pt cardiac plan

## 2025-05-15 NOTE — CHART NOTE - NSCHARTNOTEFT_GEN_A_CORE
Spoke with Dr. Deras.  Pt is to be discharged home with ASA 81mg po daily after ASA desensitization (plan to monitor 24 hrs)   Pt is instructed to make an appt to see Dr. Deras next week.

## 2025-05-15 NOTE — PROGRESS NOTE ADULT - ASSESSMENT
s/p LHC for Unstable Angina and CAC score > 1000  CAD with mid RCA 80% FFR Cathworks 0.72  Uncontrolled HTN, HLD  asa allergy    CCU admission for asa desensitization  Allergy consult appreciated--ffup recs  start clopidogrel 75mg PO daily   resume home BP meds and high intensity statin  may discharge after 24 hrs observation  ffup in my office in 1 week to plan staged PCI of RCA s/p LHC for Unstable Angina and CAC score > 1000  CAD with mid RCA 80% FFR Cathworks 0.72  Uncontrolled HTN, HLD  asa allergy    CCU admission for asa desensitization    start clopidogrel 75mg PO daily   resume home BP meds and high intensity statin  may discharge after 24 hrs observation  ffup in my office in 1 week to plan staged PCI of RCA

## 2025-05-15 NOTE — DISCHARGE NOTE PROVIDER - NSDCFUSCHEDAPPT_GEN_ALL_CORE_FT
[facility] Physician [facility]  GERIATRICS [address] [place] [address]   Scheduled Appointment: 07/29/2025

## 2025-05-15 NOTE — DISCHARGE NOTE PROVIDER - NSDCCPCAREPLAN_GEN_ALL_CORE_FT
PRINCIPAL DISCHARGE DIAGNOSIS  Diagnosis: S/P cardiac cath  Assessment and Plan of Treatment: You had an abnormal stress test, came to get an angiogram which showed mRCA 80% stenosis. Due to your history of reaction to aspirin, you got aspirin desensitization in monitored setting. Will need to follow up with your cardiologist (Dr. Deras) for intervention (percutaneous coronary intervetion). If you deveop CP or SOB, please seek medical attention.

## 2025-05-15 NOTE — DISCHARGE NOTE PROVIDER - CARE PROVIDER_API CALL
Lopez Deras  Cardiovascular Disease  [address] [place] [address]  Phone: [phone]  Fax: [phone]  Established Patient  Follow Up Time: 1 week

## 2025-05-15 NOTE — DISCHARGE NOTE PROVIDER - NSDCMRMEDTOKEN_GEN_ALL_CORE_FT
amLODIPine 5 mg oral tablet: 1 tab(s) orally once a day  carvedilol 12.5 mg oral tablet: 1 tab(s) orally 2 times a day  Cialis 5 mg oral tablet: 1 tab(s) orally once a day  hydrALAZINE 100 mg oral tablet: 1 tab(s) orally once a day (in the morning)  hydrALAZINE 50 mg oral tablet: 1 tab(s) orally once a day (in the evening)  Insulin Lispro: 3 times a day (with meals) via sliding scale three times a day with meals  Lantus Solostar Pen 100 units/mL subcutaneous solution: 28 subcutaneous once a day (at bedtime)  Linzess 145 mcg oral capsule: 1 cap(s) orally once a day  Mounjaro 2.5 mg/0.5 mL subcutaneous solution: 2.5 milligram(s) subcutaneously once a week  pantoprazole 40 mg oral delayed release tablet: 1 tab(s) orally once a day  Plavix 75 mg oral tablet: 1 tab(s) orally once a day  pravastatin 40 mg oral tablet: 1 tab(s) orally once a day (at bedtime)   amLODIPine 5 mg oral tablet: 1 tab(s) orally once a day  Aspirin Low Dose 81 mg oral delayed release tablet: 1 tab(s) orally once a day  carvedilol 12.5 mg oral tablet: 1 tab(s) orally 2 times a day  Cialis 5 mg oral tablet: 1 tab(s) orally once a day  hydrALAZINE 100 mg oral tablet: 1 tab(s) orally once a day (in the morning)  hydrALAZINE 50 mg oral tablet: 1 tab(s) orally once a day (in the evening)  Lantus Solostar Pen 100 units/mL subcutaneous solution: 28 subcutaneous once a day (at bedtime)  Linzess 145 mcg oral capsule: 1 cap(s) orally once a day  Mounjaro 2.5 mg/0.5 mL subcutaneous solution: 2.5 milligram(s) subcutaneously once a week  pantoprazole 40 mg oral delayed release tablet: 1 tab(s) orally once a day  Plavix 75 mg oral tablet: 1 tab(s) orally once a day  pravastatin 40 mg oral tablet: 1 tab(s) orally once a day (at bedtime)

## 2025-05-16 ENCOUNTER — TRANSCRIPTION ENCOUNTER (OUTPATIENT)
Age: 70
End: 2025-05-16

## 2025-05-16 VITALS
HEART RATE: 73 BPM | RESPIRATION RATE: 18 BRPM | SYSTOLIC BLOOD PRESSURE: 112 MMHG | OXYGEN SATURATION: 95 % | DIASTOLIC BLOOD PRESSURE: 76 MMHG

## 2025-05-16 LAB
ALBUMIN SERPL ELPH-MCNC: 3.6 G/DL — SIGNIFICANT CHANGE UP (ref 3.3–5)
ALP SERPL-CCNC: 59 U/L — SIGNIFICANT CHANGE UP (ref 40–120)
ALT FLD-CCNC: 15 U/L — SIGNIFICANT CHANGE UP (ref 4–41)
ANION GAP SERPL CALC-SCNC: 11 MMOL/L — SIGNIFICANT CHANGE UP (ref 7–14)
AST SERPL-CCNC: 14 U/L — SIGNIFICANT CHANGE UP (ref 4–40)
BASOPHILS # BLD AUTO: 0.04 K/UL — SIGNIFICANT CHANGE UP (ref 0–0.2)
BASOPHILS NFR BLD AUTO: 0.6 % — SIGNIFICANT CHANGE UP (ref 0–2)
BILIRUB SERPL-MCNC: 0.4 MG/DL — SIGNIFICANT CHANGE UP (ref 0.2–1.2)
BUN SERPL-MCNC: 15 MG/DL — SIGNIFICANT CHANGE UP (ref 7–23)
CALCIUM SERPL-MCNC: 9 MG/DL — SIGNIFICANT CHANGE UP (ref 8.4–10.5)
CHLORIDE SERPL-SCNC: 100 MMOL/L — SIGNIFICANT CHANGE UP (ref 98–107)
CO2 SERPL-SCNC: 22 MMOL/L — SIGNIFICANT CHANGE UP (ref 22–31)
CREAT SERPL-MCNC: 0.8 MG/DL — SIGNIFICANT CHANGE UP (ref 0.5–1.3)
EGFR: 95 ML/MIN/1.73M2 — SIGNIFICANT CHANGE UP
EGFR: 95 ML/MIN/1.73M2 — SIGNIFICANT CHANGE UP
EOSINOPHIL # BLD AUTO: 0.26 K/UL — SIGNIFICANT CHANGE UP (ref 0–0.5)
EOSINOPHIL NFR BLD AUTO: 4.1 % — SIGNIFICANT CHANGE UP (ref 0–6)
GLUCOSE BLDC GLUCOMTR-MCNC: 175 MG/DL — HIGH (ref 70–99)
GLUCOSE BLDC GLUCOMTR-MCNC: 215 MG/DL — HIGH (ref 70–99)
GLUCOSE SERPL-MCNC: 171 MG/DL — HIGH (ref 70–99)
HCT VFR BLD CALC: 44.2 % — SIGNIFICANT CHANGE UP (ref 39–50)
HGB BLD-MCNC: 14.1 G/DL — SIGNIFICANT CHANGE UP (ref 13–17)
IANC: 3.66 K/UL — SIGNIFICANT CHANGE UP (ref 1.8–7.4)
IMM GRANULOCYTES NFR BLD AUTO: 0.3 % — SIGNIFICANT CHANGE UP (ref 0–0.9)
LYMPHOCYTES # BLD AUTO: 1.66 K/UL — SIGNIFICANT CHANGE UP (ref 1–3.3)
LYMPHOCYTES # BLD AUTO: 26.1 % — SIGNIFICANT CHANGE UP (ref 13–44)
MAGNESIUM SERPL-MCNC: 2.1 MG/DL — SIGNIFICANT CHANGE UP (ref 1.6–2.6)
MCHC RBC-ENTMCNC: 28 PG — SIGNIFICANT CHANGE UP (ref 27–34)
MCHC RBC-ENTMCNC: 31.9 G/DL — LOW (ref 32–36)
MCV RBC AUTO: 87.9 FL — SIGNIFICANT CHANGE UP (ref 80–100)
MONOCYTES # BLD AUTO: 0.73 K/UL — SIGNIFICANT CHANGE UP (ref 0–0.9)
MONOCYTES NFR BLD AUTO: 11.5 % — SIGNIFICANT CHANGE UP (ref 2–14)
NEUTROPHILS # BLD AUTO: 3.66 K/UL — SIGNIFICANT CHANGE UP (ref 1.8–7.4)
NEUTROPHILS NFR BLD AUTO: 57.4 % — SIGNIFICANT CHANGE UP (ref 43–77)
NRBC # BLD AUTO: 0 K/UL — SIGNIFICANT CHANGE UP (ref 0–0)
NRBC # FLD: 0 K/UL — SIGNIFICANT CHANGE UP (ref 0–0)
NRBC BLD AUTO-RTO: 0 /100 WBCS — SIGNIFICANT CHANGE UP (ref 0–0)
PHOSPHATE SERPL-MCNC: 3.7 MG/DL — SIGNIFICANT CHANGE UP (ref 2.5–4.5)
PLATELET # BLD AUTO: 200 K/UL — SIGNIFICANT CHANGE UP (ref 150–400)
POTASSIUM SERPL-MCNC: 3.9 MMOL/L — SIGNIFICANT CHANGE UP (ref 3.5–5.3)
POTASSIUM SERPL-SCNC: 3.9 MMOL/L — SIGNIFICANT CHANGE UP (ref 3.5–5.3)
PROT SERPL-MCNC: 6.4 G/DL — SIGNIFICANT CHANGE UP (ref 6–8.3)
RBC # BLD: 5.03 M/UL — SIGNIFICANT CHANGE UP (ref 4.2–5.8)
RBC # FLD: 12.9 % — SIGNIFICANT CHANGE UP (ref 10.3–14.5)
SODIUM SERPL-SCNC: 133 MMOL/L — LOW (ref 135–145)
WBC # BLD: 6.37 K/UL — SIGNIFICANT CHANGE UP (ref 3.8–10.5)
WBC # FLD AUTO: 6.37 K/UL — SIGNIFICANT CHANGE UP (ref 3.8–10.5)

## 2025-05-16 PROCEDURE — 99291 CRITICAL CARE FIRST HOUR: CPT

## 2025-05-16 RX ORDER — MELATONIN 5 MG
3 TABLET ORAL ONCE
Refills: 0 | Status: COMPLETED | OUTPATIENT
Start: 2025-05-16 | End: 2025-05-16

## 2025-05-16 RX ORDER — CARVEDILOL 3.12 MG/1
12.5 TABLET, FILM COATED ORAL EVERY 12 HOURS
Refills: 0 | Status: DISCONTINUED | OUTPATIENT
Start: 2025-05-16 | End: 2025-05-16

## 2025-05-16 RX ORDER — INSULIN LISPRO 100 U/ML
0 INJECTION, SOLUTION INTRAVENOUS; SUBCUTANEOUS
Refills: 0 | DISCHARGE

## 2025-05-16 RX ORDER — ASPIRIN 325 MG
1 TABLET ORAL
Qty: 30 | Refills: 0
Start: 2025-05-16 | End: 2025-06-14

## 2025-05-16 RX ORDER — CARVEDILOL 3.12 MG/1
6.25 TABLET, FILM COATED ORAL ONCE
Refills: 0 | Status: COMPLETED | OUTPATIENT
Start: 2025-05-16 | End: 2025-05-16

## 2025-05-16 RX ADMIN — Medication 100 MILLIGRAM(S): at 05:17

## 2025-05-16 RX ADMIN — Medication 40 MILLIGRAM(S): at 05:17

## 2025-05-16 RX ADMIN — INSULIN LISPRO 2: 100 INJECTION, SOLUTION INTRAVENOUS; SUBCUTANEOUS at 11:42

## 2025-05-16 RX ADMIN — CLOPIDOGREL BISULFATE 75 MILLIGRAM(S): 75 TABLET, FILM COATED ORAL at 11:41

## 2025-05-16 RX ADMIN — Medication 1 APPLICATION(S): at 05:17

## 2025-05-16 RX ADMIN — Medication 3 MILLIGRAM(S): at 00:23

## 2025-05-16 RX ADMIN — INSULIN LISPRO 1: 100 INJECTION, SOLUTION INTRAVENOUS; SUBCUTANEOUS at 08:16

## 2025-05-16 RX ADMIN — INSULIN LISPRO 10 UNIT(S): 100 INJECTION, SOLUTION INTRAVENOUS; SUBCUTANEOUS at 11:43

## 2025-05-16 RX ADMIN — LINACLOTIDE 145 MICROGRAM(S): 290 CAPSULE, GELATIN COATED ORAL at 08:16

## 2025-05-16 RX ADMIN — AMLODIPINE BESYLATE 5 MILLIGRAM(S): 10 TABLET ORAL at 05:17

## 2025-05-16 RX ADMIN — HEPARIN SODIUM 5000 UNIT(S): 1000 INJECTION INTRAVENOUS; SUBCUTANEOUS at 05:17

## 2025-05-16 RX ADMIN — INSULIN LISPRO 10 UNIT(S): 100 INJECTION, SOLUTION INTRAVENOUS; SUBCUTANEOUS at 08:17

## 2025-05-16 RX ADMIN — Medication 30 MILLILITER(S): at 05:17

## 2025-05-16 RX ADMIN — CARVEDILOL 6.25 MILLIGRAM(S): 3.12 TABLET, FILM COATED ORAL at 05:17

## 2025-05-16 RX ADMIN — Medication 3 MILLILITER(S): at 05:12

## 2025-05-16 RX ADMIN — CARVEDILOL 12.5 MILLIGRAM(S): 3.12 TABLET, FILM COATED ORAL at 09:13

## 2025-05-16 RX ADMIN — Medication 81 MILLIGRAM(S): at 11:41

## 2025-05-16 RX ADMIN — CARVEDILOL 6.25 MILLIGRAM(S): 3.12 TABLET, FILM COATED ORAL at 00:09

## 2025-05-16 NOTE — PROGRESS NOTE ADULT - CRITICAL CARE ATTENDING COMMENT
69 year old man with LHC that showed 80% RCA; awaiting ASA desensitization in anticipation of PCI    MEDICATIONS  (STANDING):  Atorvastatin 40 mg daily  Plavix 75 mg daily  Hydralazine 100 mg Am  Hydralazine 50 mg PM  Insulin  Linaclotide 145 mg QD  Pantoprazole 40 mg daily  Amlodipine 5 mg daily  Coreg 12.5 mg BID    - sp ASA densensitization per Allergy team  - now for dc home

## 2025-05-16 NOTE — DISCHARGE NOTE NURSING/CASE MANAGEMENT/SOCIAL WORK - FINANCIAL ASSISTANCE
[facility] provides services at a reduced cost to those who are determined to be eligible through [facility]’s financial assistance program. Information regarding [facility]’s financial assistance program can be found by going to [email or web address] or by calling [phone].

## 2025-05-16 NOTE — DISCHARGE NOTE NURSING/CASE MANAGEMENT/SOCIAL WORK - PATIENT PORTAL LINK FT
You can access the FollowMyHealth Patient Portal offered by [facility] by registering at the following website: [email or web address]. By joining [facility]’s FollowMyHealth portal, you will also be able to view your health information using other applications (apps) compatible with our system.

## 2025-05-16 NOTE — PROGRESS NOTE ADULT - ASSESSMENT
69M with PMH of HTN, HLD, T2DM, ED, GERD presented to [facility] for elective cardiac catheterization found to have an 80% mid RCA lesion. Patient presenting to the CCU for aspirin desensitization prior to the revascularization procedure.    PLAN:    ALLERGY and IMMUNOLOGY:  -Aspirin desensitization completed and tolerated yesterday  -Itchy throat overnight unlikely to be related to aspirin  -as per Dr. Teague, no objection to dc home today and patient can safely be on aspirin after his stent procedure    CARDIAC:  #CAD  s/p LHC shows mRCA 80%  -right rad band site c/d/i  -continue plavix  -continue lipitor      #HTN  hold coreg for ASA desensitization  c/w hydral  -hold amlodipine      GI:  #GERD  -Continue protonix   -simethicone X1    :  #hyponatremia  -134-->133  -continue to monitor  -Normal renal indices    ENDO:  #T2DM  -home: 28 units of lantus at night  -fingersticks QAC and QHS  -lantus 14u qhs    VTE:  HSQ after band comes off (Start in am)    Disposition   -Plan to get cardiac cath with revascularization/stenting as an outpatient.   69M with PMH of HTN, HLD, T2DM, ED, GERD presented to [facility] for elective cardiac catheterization found to have an 80% mid RCA lesion. Patient presenting to the CCU for aspirin desensitization prior to the revascularization procedure.    PLAN:    ALLERGY and IMMUNOLOGY:  -Aspirin desensitization completed and tolerated yesterday  -Itchy throat overnight unlikely to be related to aspirin  -as per Dr. Teague, no objection to dc home today and patient can safely be on aspirin after his stent procedure    CARDIAC:  #CAD  s/p LHC shows mRCA 80%  -right rad band site c/d/i  -continue plavix  -continue lipitor  -initiated home medications: coreg 12.5 bid, hydral 100 in am, hydral 50 QHs, amlodipine 5    GI:  #GERD  -Continue protonix   -simethicone X1    ENDO:  #T2DM  -home: 28 units of lantus at night  -fingersticks QAC and QHS  -lantus 14u qhs    VTE:  HSQ after band comes off (Start in am)    Disposition   DC home today

## 2025-05-16 NOTE — PROGRESS NOTE ADULT - SUBJECTIVE AND OBJECTIVE BOX
Subjective and objective:  Overnight events: s/p diagnostic [facility], found to have mRCA 80%, plan for ASA desensitization. additional hydral 25mg po X1 given for 's-190's. c/o epigastric discomfort, pt reports he gets this discomfort every morning for past 5 years, and takes Gas X to relieve. Denies CP or SOB. right rad band site free of bleed/hematoma or tingling or numbness.  Tele event: SR  VR 80's, occasional PVC's      MEDICATIONS  (STANDING):  Alma-[place] (containing Aspirin 325mg) 1 Tablet(s),Sterile Water 325 milliLiter(s)   Oral every 1 hour  atorvastatin 40 milliGRAM(s) Oral at bedtime  chlorhexidine 2% Cloths 1 Application(s) Topical <User Schedule>  clopidogrel Tablet 75 milliGRAM(s) Oral daily  dextrose 5%. 1000 milliLiter(s) (100 mL/Hr) IV Continuous <Continuous>  dextrose 5%. 1000 milliLiter(s) (50 mL/Hr) IV Continuous <Continuous>  dextrose 50% Injectable 25 Gram(s) IV Push once  dextrose 50% Injectable 12.5 Gram(s) IV Push once  dextrose 50% Injectable 25 Gram(s) IV Push once  glucagon  Injectable 1 milliGRAM(s) IntraMuscular once  hydrALAZINE 100 milliGRAM(s) Oral <User Schedule>  hydrALAZINE 50 milliGRAM(s) Oral <User Schedule>  insulin glargine Injectable (LANTUS) 14 Unit(s) SubCutaneous at bedtime  insulin lispro (ADMELOG) corrective regimen sliding scale   SubCutaneous three times a day before meals  insulin lispro (ADMELOG) corrective regimen sliding scale   SubCutaneous at bedtime  insulin lispro (ADMELOG) corrective regimen sliding scale   SubCutaneous three times a day before meals  insulin lispro Injectable (ADMELOG) 22 Unit(s) SubCutaneous before dinner  linaclotide 145 MICROGram(s) Oral before breakfast  pantoprazole    Tablet 40 milliGRAM(s) Oral before breakfast  sodium chloride 0.9% lock flush 3 milliLiter(s) IV Push every 8 hours    MEDICATIONS  (PRN):  dextrose Oral Gel 15 Gram(s) Oral once PRN Blood Glucose LESS THAN 70 milliGRAM(s)/deciliter        Vital Signs Last 24 Hrs  T(C): 36.8 (15 May 2025 08:00), Max: 37 (14 May 2025 15:46)  T(F): 98.2 (15 May 2025 08:00), Max: 98.6 (14 May 2025 15:46)  HR: 79 (15 May 2025 07:00) (63 - 94)  BP: 114/57 (15 May 2025 07:00) (114/57 - 193/90)  BP(mean): 72 (15 May 2025 07:00) (72 - 118)  RR: 12 (15 May 2025 07:00) (12 - 18)  SpO2: 97% (15 May 2025 07:00) (93% - 99%)    Parameters below as of 15 May 2025 08:00  Patient On (Oxygen Delivery Method): room air      I&O's Detail    14 May 2025 07:01  -  15 May 2025 07:00  --------------------------------------------------------  IN:    Oral Fluid: 720 mL  Total IN: 720 mL    OUT:    Voided (mL): 1300 mL  Total OUT: 1300 mL    Total NET: -580 mL          Physical Exam:   Constitutional: well appearing  Neck: supple, no JVD  Respiratory: clear breath sounds bilaterally,  no use of extra accessory muscles.  Cardiovascular: RRR, normal S1, S2, no gallops, or murmurs.   Gastrointestinal: soft, non-tender, non-distended, normal bowel sounds,  Extremities: no edema, no clubbing, no cyanosis  Vascular: palpable peripheral pulses, no cyanosis,  Neurological: Awake, follows commands  Skin: no skin lesions, no rash, no ulceration                            14.4   6.64  )-----------( 204      ( 15 May 2025 05:50 )             44.6       14 May 2025 15:35    134[L]  |  96[L]  |  14     ----------------------------<  160[H]  4.0     |  26     |  0.82     Ca    9.2        14 May 2025 15:35                              
FOLLOW UP:  Aspirin desensitization    SUBJECTIVE/OBSERVATIONS:  Patient seen and examined. He has no complaints of pain or shortness of breath.  He reportedly had an itchy throat last night at 11pm last night. Benadryl was given  He has no further reports of itchiness    INTERVAL EVENTS:  Itchy throat  Allergy and immunology was notifed today at 8am.  Dr. Teague is comfortable sending patient home today and does not believe the itchiness in the throat is related to aspirin. The aspirin desensitization protocol was completed early yesterday and patient had no reactions.     TELE EVENTS:   Normal sinus with biphasic Pwave    Vital Signs Last 24 Hrs  T(C): 36.7 (16 May 2025 04:00), Max: 37 (15 May 2025 20:00)  T(F): 98 (16 May 2025 04:00), Max: 98.6 (15 May 2025 20:00)  HR: 73 (16 May 2025 07:00) (64 - 92)  BP: 110/66 (16 May 2025 07:00) (110/66 - 175/79)  BP(mean): 79 (16 May 2025 07:00) (79 - 115)  RR: 14 (16 May 2025 07:00) (12 - 24)  SpO2: 95% (16 May 2025 07:00) (93% - 100%)    Parameters below as of 16 May 2025 07:00  Patient On (Oxygen Delivery Method): room air      I&O's Summary    15 May 2025 07:01  -  16 May 2025 07:00  --------------------------------------------------------  IN: 1440 mL / OUT: 2600 mL / NET: -1160 mL        MEDICATIONS:  amLODIPine   Tablet 5 milliGRAM(s) Oral daily  aspirin enteric coated 81 milliGRAM(s) Oral daily  clopidogrel Tablet 75 milliGRAM(s) Oral daily  EPINEPHrine     1 mG/mL Injectable 0.3 milliGRAM(s) IntraMuscular once PRN  heparin   Injectable 5000 Unit(s) SubCutaneous every 8 hours  hydrALAZINE 100 milliGRAM(s) Oral <User Schedule>  hydrALAZINE 50 milliGRAM(s) Oral <User Schedule>  albuterol/ipratropium for Nebulization. 3 milliLiter(s) Nebulizer once PRN  aluminum hydroxide/magnesium hydroxide/simethicone Suspension 30 milliLiter(s) Oral every 6 hours PRN  linaclotide 145 MICROGram(s) Oral before breakfast  pantoprazole    Tablet 40 milliGRAM(s) Oral before breakfast  atorvastatin 40 milliGRAM(s) Oral at bedtime  dextrose 50% Injectable 25 Gram(s) IV Push once  dextrose 50% Injectable 12.5 Gram(s) IV Push once  dextrose 50% Injectable 25 Gram(s) IV Push once  dextrose Oral Gel 15 Gram(s) Oral once PRN  glucagon  Injectable 1 milliGRAM(s) IntraMuscular once  insulin glargine Injectable (LANTUS) 14 Unit(s) SubCutaneous at bedtime  insulin lispro (ADMELOG) corrective regimen sliding scale   SubCutaneous three times a day before meals  insulin lispro (ADMELOG) corrective regimen sliding scale   SubCutaneous at bedtime  insulin lispro Injectable (ADMELOG) 10 Unit(s) SubCutaneous before breakfast  insulin lispro Injectable (ADMELOG) 10 Unit(s) SubCutaneous before lunch  insulin lispro Injectable (ADMELOG) 10 Unit(s) SubCutaneous before dinner    chlorhexidine 2% Cloths 1 Application(s) Topical <User Schedule>  dextrose 5%. 1000 milliLiter(s) IV Continuous <Continuous>  dextrose 5%. 1000 milliLiter(s) IV Continuous <Continuous>  sodium chloride 0.9% lock flush 3 milliLiter(s) IV Push every 8 hours      REVIEW OF SYSTEMS:  CONSTITUTIONAL: No weakness, fevers or chills  EYES/ENT: No visual changes;  No vertigo or throat pain   NECK: No pain or stiffness  RESPIRATORY: No cough, wheezing, hemoptysis; No shortness of breath  CARDIOVASCULAR: No chest pain or palpitations  GASTROINTESTINAL: No abdominal or epigastric pain. No nausea, vomiting, or hematemesis; No diarrhea or constipation. No melena or hematochezia.  GENITOURINARY: No dysuria, frequency or hematuria  NEUROLOGICAL: No numbness or weakness  SKIN: No itching, rashes        PHYSICAL EXAM:  General: NAD  Cardiovascular: Normal S1 S2, No JVD, No murmurs, No edema  Respiratory: Lungs clear to auscultation	  Gastrointestinal:  Soft, Non-tender, + BS	  Skin: warm and dry, No rashes, No ecchymoses, No cyanosis	  Extremities: Normal range of motion, No clubbing, cyanosis or edema  Vascular: Peripheral pulses palpable 2+ bilaterally    LABS:	 	    CBC Full  -  ( 16 May 2025 04:30 )  WBC Count : 6.37 K/uL  Hemoglobin : 14.1 g/dL  Hematocrit : 44.2 %  Platelet Count - Automated : 200 K/uL  Mean Cell Volume : 87.9 fL  Mean Cell Hemoglobin : 28.0 pg  Mean Cell Hemoglobin Concentration : 31.9 g/dL  Auto Neutrophil # : x  Auto Lymphocyte # : x  Auto Monocyte # : x  Auto Eosinophil # : x  Auto Basophil # : x  Auto Neutrophil % : x  Auto Lymphocyte % : x  Auto Monocyte % : x  Auto Eosinophil % : x  Auto Basophil % : x    05-16    133[L]  |  100  |  15  ----------------------------<  171[H]  3.9   |  22  |  0.80  05-15    133[L]  |  97[L]  |  14  ----------------------------<  165[H]  4.2   |  24  |  0.83    Ca    9.0      16 May 2025 04:30  Ca    9.0      15 May 2025 05:50  Phos  3.7     05-16  Phos  3.6     05-15  Mg     2.10     05-16  Mg     2.10     05-15    TPro  6.4  /  Alb  3.6  /  TBili  0.4  /  DBili  x   /  AST  14  /  ALT  15  /  AlkPhos  59  05-16  TPro  6.5  /  Alb  3.7  /  TBili  0.4  /  DBili  x   /  AST  15  /  ALT  14  /  AlkPhos  57  05-15                  	  
S/  Pt in CCU for asa desensitization  No chest pain or SOB at rest.     O/  Vital Signs Last 24 Hrs  T(C): 36.8 (15 May 2025 08:00), Max: 37 (14 May 2025 15:46)  T(F): 98.2 (15 May 2025 08:00), Max: 98.6 (14 May 2025 15:46)  HR: 79 (15 May 2025 07:00) (63 - 94)  BP: 114/57 (15 May 2025 07:00) (114/57 - 193/90)  BP(mean): 72 (15 May 2025 07:00) (72 - 118)  RR: 12 (15 May 2025 07:00) (12 - 18)  SpO2: 97% (15 May 2025 07:00) (93% - 99%)    Parameters below as of 15 May 2025 08:00  Patient On (Oxygen Delivery Method): room air      I&O's Detail    14 May 2025 07:01  -  15 May 2025 07:00  --------------------------------------------------------  IN:    Oral Fluid: 720 mL  Total IN: 720 mL    OUT:    Voided (mL): 1300 mL  Total OUT: 1300 mL    Total NET: -580 mL          Physical Exam:   Constitutional: well appearing  Neck: supple, no JVD  Respiratory: clear breath sounds bilaterally,  no use of extra accessory muscles.  Cardiovascular: RRR, normal S1, S2, no gallops, or murmurs.   Gastrointestinal: soft, non-tender, non-distended, normal bowel sounds,  Extremities: no edema, no clubbing, no cyanosis; right radial a. no hematoma  Vascular: palpable peripheral pulses, no cyanosis,  Neurological: Awake, follows commands  Skin: no skin lesions, no rash, no ulceration                            14.4   6.64  )-----------( 204      ( 15 May 2025 05:50 )             44.6       14 May 2025 15:35    134[L]  |  96[L]  |  14     ----------------------------<  160[H]  4.0     |  26     |  0.82     Ca    9.2        14 May 2025 15:35

## 2025-05-17 PROBLEM — N52.9 MALE ERECTILE DYSFUNCTION, UNSPECIFIED: Chronic | Status: ACTIVE | Noted: 2025-05-14

## 2025-05-17 PROBLEM — E78.5 HYPERLIPIDEMIA, UNSPECIFIED: Chronic | Status: ACTIVE | Noted: 2025-05-14

## 2025-05-17 PROBLEM — I10 ESSENTIAL (PRIMARY) HYPERTENSION: Chronic | Status: ACTIVE | Noted: 2025-05-14

## 2025-05-20 ENCOUNTER — APPOINTMENT (OUTPATIENT)
Dept: GERIATRICS | Facility: CLINIC | Age: 70
End: 2025-05-20
Payer: MEDICARE

## 2025-05-20 VITALS
HEIGHT: 68 IN | TEMPERATURE: 98 F | HEART RATE: 70 BPM | SYSTOLIC BLOOD PRESSURE: 176 MMHG | BODY MASS INDEX: 31.22 KG/M2 | WEIGHT: 206 LBS | RESPIRATION RATE: 16 BRPM | DIASTOLIC BLOOD PRESSURE: 79 MMHG | OXYGEN SATURATION: 98 %

## 2025-05-20 DIAGNOSIS — I25.10 ATHEROSCLEROTIC HEART DISEASE OF NATIVE CORONARY ARTERY W/OUT ANGINA PECTORIS: ICD-10-CM

## 2025-05-20 DIAGNOSIS — Z76.89 PERSONS ENCOUNTERING HEALTH SERVICES IN OTHER SPECIFIED CIRCUMSTANCES: ICD-10-CM

## 2025-05-20 DIAGNOSIS — I10 ESSENTIAL (PRIMARY) HYPERTENSION: ICD-10-CM

## 2025-05-20 DIAGNOSIS — E11.8 TYPE 2 DIABETES MELLITUS WITH UNSPECIFIED COMPLICATIONS: ICD-10-CM

## 2025-05-20 PROCEDURE — 99215 OFFICE O/P EST HI 40 MIN: CPT | Mod: GC

## 2025-05-20 PROCEDURE — G2211 COMPLEX E/M VISIT ADD ON: CPT

## 2025-05-21 DIAGNOSIS — E11.9 TYPE 2 DIABETES MELLITUS W/OUT COMPLICATIONS: ICD-10-CM

## 2025-05-21 PROBLEM — Z76.89 ENCOUNTER FOR SUPPORT AND COORDINATION OF TRANSITION OF CARE: Status: ACTIVE | Noted: 2025-05-20

## 2025-05-21 RX ORDER — HYDRALAZINE HYDROCHLORIDE 50 MG/1
50 TABLET ORAL
Refills: 0 | Status: ACTIVE | COMMUNITY
Start: 2025-05-20

## 2025-05-21 RX ORDER — AMLODIPINE BESYLATE 5 MG/1
5 TABLET ORAL DAILY
Refills: 0 | Status: ACTIVE | COMMUNITY
Start: 2025-05-20

## 2025-06-04 PROBLEM — E11.9 TYPE 2 DIABETES MELLITUS WITHOUT COMPLICATIONS: Chronic | Status: ACTIVE | Noted: 2025-05-14

## 2025-06-04 PROBLEM — K21.9 GASTRO-ESOPHAGEAL REFLUX DISEASE WITHOUT ESOPHAGITIS: Chronic | Status: ACTIVE | Noted: 2025-05-14

## 2025-06-25 ENCOUNTER — INPATIENT (INPATIENT)
Facility: HOSPITAL | Age: 70
LOS: 0 days | Discharge: HOME | End: 2025-06-26
Attending: INTERNAL MEDICINE | Admitting: INTERNAL MEDICINE
Payer: MEDICARE

## 2025-06-25 VITALS
WEIGHT: 205.03 LBS | HEART RATE: 72 BPM | TEMPERATURE: 98 F | SYSTOLIC BLOOD PRESSURE: 175 MMHG | RESPIRATION RATE: 18 BRPM | HEIGHT: 68 IN | DIASTOLIC BLOOD PRESSURE: 81 MMHG | OXYGEN SATURATION: 97 %

## 2025-06-25 DIAGNOSIS — Z98.890 OTHER SPECIFIED POSTPROCEDURAL STATES: Chronic | ICD-10-CM

## 2025-06-25 DIAGNOSIS — Z98.49 CATARACT EXTRACTION STATUS, UNSPECIFIED EYE: Chronic | ICD-10-CM

## 2025-06-25 DIAGNOSIS — I20.0 UNSTABLE ANGINA: ICD-10-CM

## 2025-06-25 LAB
ANION GAP SERPL CALC-SCNC: 10 MMOL/L — SIGNIFICANT CHANGE UP (ref 7–14)
BUN SERPL-MCNC: 18 MG/DL — SIGNIFICANT CHANGE UP (ref 7–23)
CALCIUM SERPL-MCNC: 8.8 MG/DL — SIGNIFICANT CHANGE UP (ref 8.4–10.5)
CHLORIDE SERPL-SCNC: 100 MMOL/L — SIGNIFICANT CHANGE UP (ref 98–107)
CO2 SERPL-SCNC: 25 MMOL/L — SIGNIFICANT CHANGE UP (ref 22–31)
CREAT SERPL-MCNC: 0.9 MG/DL — SIGNIFICANT CHANGE UP (ref 0.5–1.3)
EGFR: 91 ML/MIN/1.73M2 — SIGNIFICANT CHANGE UP
EGFR: 91 ML/MIN/1.73M2 — SIGNIFICANT CHANGE UP
GLUCOSE BLDC GLUCOMTR-MCNC: 165 MG/DL — HIGH (ref 70–99)
GLUCOSE BLDC GLUCOMTR-MCNC: 180 MG/DL — HIGH (ref 70–99)
GLUCOSE BLDC GLUCOMTR-MCNC: 199 MG/DL — HIGH (ref 70–99)
GLUCOSE SERPL-MCNC: 180 MG/DL — HIGH (ref 70–99)
HCT VFR BLD CALC: 40.5 % — SIGNIFICANT CHANGE UP (ref 39–50)
HGB BLD-MCNC: 13.3 G/DL — SIGNIFICANT CHANGE UP (ref 13–17)
MCHC RBC-ENTMCNC: 28.4 PG — SIGNIFICANT CHANGE UP (ref 27–34)
MCHC RBC-ENTMCNC: 32.8 G/DL — SIGNIFICANT CHANGE UP (ref 32–36)
MCV RBC AUTO: 86.5 FL — SIGNIFICANT CHANGE UP (ref 80–100)
NRBC # BLD AUTO: 0 K/UL — SIGNIFICANT CHANGE UP (ref 0–0)
NRBC # FLD: 0 K/UL — SIGNIFICANT CHANGE UP (ref 0–0)
NRBC BLD AUTO-RTO: 0 /100 WBCS — SIGNIFICANT CHANGE UP (ref 0–0)
PLATELET # BLD AUTO: 215 K/UL — SIGNIFICANT CHANGE UP (ref 150–400)
PMV BLD: 11.2 FL — SIGNIFICANT CHANGE UP (ref 7–13)
POTASSIUM SERPL-MCNC: 4.3 MMOL/L — SIGNIFICANT CHANGE UP (ref 3.5–5.3)
POTASSIUM SERPL-SCNC: 4.3 MMOL/L — SIGNIFICANT CHANGE UP (ref 3.5–5.3)
RBC # BLD: 4.68 M/UL — SIGNIFICANT CHANGE UP (ref 4.2–5.8)
RBC # FLD: 13 % — SIGNIFICANT CHANGE UP (ref 10.3–14.5)
SODIUM SERPL-SCNC: 135 MMOL/L — SIGNIFICANT CHANGE UP (ref 135–145)
WBC # BLD: 8.92 K/UL — SIGNIFICANT CHANGE UP (ref 3.8–10.5)
WBC # FLD AUTO: 8.92 K/UL — SIGNIFICANT CHANGE UP (ref 3.8–10.5)

## 2025-06-25 PROCEDURE — 93010 ELECTROCARDIOGRAM REPORT: CPT | Mod: 76

## 2025-06-25 RX ORDER — CARVEDILOL 3.12 MG/1
12.5 TABLET, FILM COATED ORAL EVERY 12 HOURS
Refills: 0 | Status: DISCONTINUED | OUTPATIENT
Start: 2025-06-25 | End: 2025-06-26

## 2025-06-25 RX ORDER — SODIUM CHLORIDE 9 G/1000ML
1000 INJECTION, SOLUTION INTRAVENOUS
Refills: 0 | Status: DISCONTINUED | OUTPATIENT
Start: 2025-06-25 | End: 2025-06-26

## 2025-06-25 RX ORDER — INSULIN LISPRO 100 U/ML
INJECTION, SOLUTION INTRAVENOUS; SUBCUTANEOUS AT BEDTIME
Refills: 0 | Status: DISCONTINUED | OUTPATIENT
Start: 2025-06-25 | End: 2025-06-26

## 2025-06-25 RX ORDER — INSULIN LISPRO 100 U/ML
INJECTION, SOLUTION INTRAVENOUS; SUBCUTANEOUS
Refills: 0 | Status: DISCONTINUED | OUTPATIENT
Start: 2025-06-25 | End: 2025-06-26

## 2025-06-25 RX ORDER — CLOPIDOGREL BISULFATE 75 MG/1
600 TABLET, FILM COATED ORAL ONCE
Refills: 0 | Status: COMPLETED | OUTPATIENT
Start: 2025-06-25 | End: 2025-06-25

## 2025-06-25 RX ORDER — INSULIN GLARGINE-YFGN 100 [IU]/ML
22 INJECTION, SOLUTION SUBCUTANEOUS AT BEDTIME
Refills: 0 | Status: DISCONTINUED | OUTPATIENT
Start: 2025-06-25 | End: 2025-06-26

## 2025-06-25 RX ORDER — ASPIRIN 325 MG
81 TABLET ORAL DAILY
Refills: 0 | Status: DISCONTINUED | OUTPATIENT
Start: 2025-06-26 | End: 2025-06-26

## 2025-06-25 RX ORDER — DEXTROSE 50 % IN WATER 50 %
25 SYRINGE (ML) INTRAVENOUS ONCE
Refills: 0 | Status: DISCONTINUED | OUTPATIENT
Start: 2025-06-25 | End: 2025-06-26

## 2025-06-25 RX ORDER — GLUCAGON 3 MG/1
1 POWDER NASAL ONCE
Refills: 0 | Status: DISCONTINUED | OUTPATIENT
Start: 2025-06-25 | End: 2025-06-26

## 2025-06-25 RX ORDER — DEXTROSE 50 % IN WATER 50 %
12.5 SYRINGE (ML) INTRAVENOUS ONCE
Refills: 0 | Status: DISCONTINUED | OUTPATIENT
Start: 2025-06-25 | End: 2025-06-26

## 2025-06-25 RX ORDER — CLOPIDOGREL BISULFATE 75 MG/1
75 TABLET, FILM COATED ORAL DAILY
Refills: 0 | Status: DISCONTINUED | OUTPATIENT
Start: 2025-06-26 | End: 2025-06-26

## 2025-06-25 RX ORDER — AMLODIPINE BESYLATE 10 MG/1
5 TABLET ORAL DAILY
Refills: 0 | Status: DISCONTINUED | OUTPATIENT
Start: 2025-06-26 | End: 2025-06-26

## 2025-06-25 RX ORDER — ATORVASTATIN CALCIUM 80 MG/1
40 TABLET, FILM COATED ORAL AT BEDTIME
Refills: 0 | Status: DISCONTINUED | OUTPATIENT
Start: 2025-06-25 | End: 2025-06-26

## 2025-06-25 RX ORDER — DEXTROSE 50 % IN WATER 50 %
15 SYRINGE (ML) INTRAVENOUS ONCE
Refills: 0 | Status: DISCONTINUED | OUTPATIENT
Start: 2025-06-25 | End: 2025-06-26

## 2025-06-25 RX ADMIN — INSULIN GLARGINE-YFGN 22 UNIT(S): 100 INJECTION, SOLUTION SUBCUTANEOUS at 21:15

## 2025-06-25 RX ADMIN — Medication 50 MILLIGRAM(S): at 21:15

## 2025-06-25 RX ADMIN — CLOPIDOGREL BISULFATE 600 MILLIGRAM(S): 75 TABLET, FILM COATED ORAL at 14:58

## 2025-06-25 RX ADMIN — ATORVASTATIN CALCIUM 40 MILLIGRAM(S): 80 TABLET, FILM COATED ORAL at 21:15

## 2025-06-25 RX ADMIN — CARVEDILOL 12.5 MILLIGRAM(S): 3.12 TABLET, FILM COATED ORAL at 21:15

## 2025-06-25 RX ADMIN — Medication 3 MILLILITER(S): at 22:19

## 2025-06-25 NOTE — CHART NOTE - NSCHARTNOTEFT_GEN_A_CORE
Cardiac Rehabilitation Referral Post PCI:       - Education on benefits of cardiac rehab provided to patient: Yes  - Referral and prescription given for cardiac rehab: Yes  - Patient given a list of locations and instructed to contact their insurance company to review list of participating providers: Yes  - Patient instructed to bring cardiac rehab prescription with them to cardiology follow up appointment for assistance with enrollment: Yes  - Patient discharged with copies detailing cardiovascular history, medications, testing/treatments: Yes
Overnight Medicine ACP Coverage    Patient is s/p cardiac cath via right radial access. Patient without any complaints. Site is stable with no hematoma or active bleed noted. No swelling, dressing is clean/intact/dry. Right Radial pulse palpable.  strength is equal bilaterally. Sensation intact bilaterally. Patient has full ROM in right wrist. Capillary refill < 2 seconds.     T(C): 36.6 (06-25-25 @ 12:16), Max: 36.6 (06-25-25 @ 12:16)  HR: 64 (06-25-25 @ 17:00) (59 - 72)  BP: 160/83 (06-25-25 @ 17:00) (150/81 - 175/81)  RR: 14 (06-25-25 @ 17:00) (14 - 18)  SpO2: 99% (06-25-25 @ 17:00) (97% - 100%)    Educated patient to inform nursing for any numbness and/or tingling of extremities, signs of bleeding, chest pain, shortness of breath or any other concerning symptoms. Patient verbalized understanding. Will continue to monitor.    Mikey Shine PA-C  Department of Medicine  [facility]   In House Page [phone]

## 2025-06-25 NOTE — H&P CARDIOLOGY - NSICDXPASTMEDICALHX_GEN_ALL_CORE_FT
PAST MEDICAL HISTORY:  CAD (coronary artery disease)     DM2 (diabetes mellitus, type 2)     Erectile dysfunction     GERD (gastroesophageal reflux disease)     HLD (hyperlipidemia)     HTN (hypertension), benign

## 2025-06-25 NOTE — H&P CARDIOLOGY - COMMENTS
Pre-sedation evaluation    Dentures: none  Last PO intake: 6/24/25 20:40    Level of consciousness: 1  Obstructive sleep apnea: No  Aspiration risk: No  Mallampati score: 2  ASA Classification: 2  Prior Sedative or Anesthesia Experience: No complications  Informed consent by responsible adult: Yes  Responsible adult escort: Yes  Based on today's assessment, anesthesia consult requested: No

## 2025-06-25 NOTE — PATIENT PROFILE ADULT - FALL HARM RISK - HARM RISK INTERVENTIONS
Assistance with ambulation/Assistance OOB with selected safe patient handling equipment/Communicate Risk of Fall with Harm to all staff/Discuss with provider need for PT consult/Monitor for mental status changes/Monitor gait and stability/Reinforce activity limits and safety measures with patient and family/Reorient to person, place and time as needed/Review medications for side effects contributing to fall risk/Sit up slowly, dangle for a short time, stand at bedside before walking/Tailored Fall Risk Interventions/Toileting schedule using arm’s reach rule for commode and bathroom/Use of alarms - bed, chair and/or voice tab/Visual Cue: Yellow wristband and red socks/Bed in lowest position, wheels locked, appropriate side rails in place/Call bell, personal items and telephone in reach/Instruct patient to call for assistance before getting out of bed or chair/Non-slip footwear when patient is out of bed/[place] to call system/Physically safe environment - no spills, clutter or unnecessary equipment/Purposeful Proactive Rounding/Room/bathroom lighting operational, light cord in reach

## 2025-06-25 NOTE — H&P CARDIOLOGY - HISTORY OF PRESENT ILLNESS
y.o. male/female with PMH of CAD, HTN, HLD, Diabetes Mellitus 2,  presents today for elective PCI to RCA. The patient denies chest pain, SOB, palpitations, presyncope, syncope,  headache, visual disturbances, CVA, PE, DVT, [place], abdominal pain, N/V/D/C, hematochezia, melena, dysuria, hematuria, fever, chills. The patient was evaluated by a cardiologist. Due to patient's symptoms and abnormal non invasive findings, the patient  was recommended to have cardiac catheterization. The patient denies any complaints at present.     < from: Cardiac Catheterization (05.14.25 @ 17:23) >  Coronary Angiography   The coronary circulation is right dominant.      LM   Left main artery: Angiography shows no disease. AMY Flow 3.      LAD     Patient: TINA ASCENCIO           MRN: [id number]  Study Date: 05/14/2025   05:23 PM      Page 1 of 4          Left anterior descending artery: Angiography shows mild  atherosclerosis. There is a 20 % stenosis in the middle third portion  of  the segment. AMY Flow 3.      CX   Circumflex: Angiography shows minor irregularities. AMY Flow 3.      RCA   Right coronary artery: Angiography shows severe atherosclerosis.  CathWorks FFR Angio 0.72. There is an 80 % stenosis in  the middle third portion of the segment. AMY Flow 3.      Left Heart Cath   Ejection fraction is 70%. LV to AO pullback was performed and there is  no pressure gradient. The left ventricular end diastolic  pressure was 9 mmHg.        < end of copied text >   69 y.o. male with PMH of CAD, HTN, HLD, Diabetes Mellitus 2,  presents today for elective PCI to RCA. The patient c/o L sided chest pain on and off, mostly at night. The patient denies SOB, palpitations, presyncope, syncope,  headache, visual disturbances, CVA, PE, DVT, [place], abdominal pain, N/V/D/C, hematochezia, melena, dysuria, hematuria, fever, chills. The patient is s/p cardiac cath in 5/2025, was found to have RCA 80%, did not have PCI as needed to have densensitization to Aspirin. At present, the patient is on ASA and Plavix daily.   < from: Cardiac Catheterization (05.14.25 @ 17:23) >  Coronary Angiography   The coronary circulation is right dominant.      LM   Left main artery: Angiography shows no disease. AMY Flow 3.      LAD     Patient: TINA ASCENCIO           MRN: [id number]  Study Date: 05/14/2025   05:23 PM      Page 1 of 4          Left anterior descending artery: Angiography shows mild  atherosclerosis. There is a 20 % stenosis in the middle third portion  of  the segment. AMY Flow 3.      CX   Circumflex: Angiography shows minor irregularities. AMY Flow 3.      RCA   Right coronary artery: Angiography shows severe atherosclerosis.  CathWorks FFR Angio 0.72. There is an 80 % stenosis in  the middle third portion of the segment. AMY Flow 3.      Left Heart Cath   Ejection fraction is 70%. LV to AO pullback was performed and there is  no pressure gradient. The left ventricular end diastolic  pressure was 9 mmHg.        < end of copied text >

## 2025-06-26 ENCOUNTER — TRANSCRIPTION ENCOUNTER (OUTPATIENT)
Age: 70
End: 2025-06-26

## 2025-06-26 VITALS
TEMPERATURE: 98 F | RESPIRATION RATE: 18 BRPM | SYSTOLIC BLOOD PRESSURE: 130 MMHG | HEART RATE: 73 BPM | DIASTOLIC BLOOD PRESSURE: 60 MMHG | OXYGEN SATURATION: 100 %

## 2025-06-26 LAB
ANION GAP SERPL CALC-SCNC: 14 MMOL/L — SIGNIFICANT CHANGE UP (ref 7–14)
BUN SERPL-MCNC: 16 MG/DL — SIGNIFICANT CHANGE UP (ref 7–23)
CALCIUM SERPL-MCNC: 8.4 MG/DL — SIGNIFICANT CHANGE UP (ref 8.4–10.5)
CHLORIDE SERPL-SCNC: 95 MMOL/L — LOW (ref 98–107)
CO2 SERPL-SCNC: 20 MMOL/L — LOW (ref 22–31)
CREAT SERPL-MCNC: 0.78 MG/DL — SIGNIFICANT CHANGE UP (ref 0.5–1.3)
EGFR: 95 ML/MIN/1.73M2 — SIGNIFICANT CHANGE UP
EGFR: 95 ML/MIN/1.73M2 — SIGNIFICANT CHANGE UP
GLUCOSE BLDC GLUCOMTR-MCNC: 210 MG/DL — HIGH (ref 70–99)
GLUCOSE BLDC GLUCOMTR-MCNC: 260 MG/DL — HIGH (ref 70–99)
GLUCOSE SERPL-MCNC: 193 MG/DL — HIGH (ref 70–99)
HCT VFR BLD CALC: 40.8 % — SIGNIFICANT CHANGE UP (ref 39–50)
HGB BLD-MCNC: 13.5 G/DL — SIGNIFICANT CHANGE UP (ref 13–17)
MAGNESIUM SERPL-MCNC: 2 MG/DL — SIGNIFICANT CHANGE UP (ref 1.6–2.6)
MCHC RBC-ENTMCNC: 28.2 PG — SIGNIFICANT CHANGE UP (ref 27–34)
MCHC RBC-ENTMCNC: 33.1 G/DL — SIGNIFICANT CHANGE UP (ref 32–36)
MCV RBC AUTO: 85.4 FL — SIGNIFICANT CHANGE UP (ref 80–100)
NRBC # BLD AUTO: 0 K/UL — SIGNIFICANT CHANGE UP (ref 0–0)
NRBC # FLD: 0 K/UL — SIGNIFICANT CHANGE UP (ref 0–0)
NRBC BLD AUTO-RTO: 0 /100 WBCS — SIGNIFICANT CHANGE UP (ref 0–0)
PHOSPHATE SERPL-MCNC: 3.8 MG/DL — SIGNIFICANT CHANGE UP (ref 2.5–4.5)
PLATELET # BLD AUTO: 208 K/UL — SIGNIFICANT CHANGE UP (ref 150–400)
PMV BLD: 12 FL — SIGNIFICANT CHANGE UP (ref 7–13)
POTASSIUM SERPL-MCNC: 4.1 MMOL/L — SIGNIFICANT CHANGE UP (ref 3.5–5.3)
POTASSIUM SERPL-SCNC: 4.1 MMOL/L — SIGNIFICANT CHANGE UP (ref 3.5–5.3)
RBC # BLD: 4.78 M/UL — SIGNIFICANT CHANGE UP (ref 4.2–5.8)
RBC # FLD: 12.9 % — SIGNIFICANT CHANGE UP (ref 10.3–14.5)
SODIUM SERPL-SCNC: 129 MMOL/L — LOW (ref 135–145)
WBC # BLD: 7.29 K/UL — SIGNIFICANT CHANGE UP (ref 3.8–10.5)
WBC # FLD AUTO: 7.29 K/UL — SIGNIFICANT CHANGE UP (ref 3.8–10.5)

## 2025-06-26 RX ORDER — MAGNESIUM, ALUMINUM HYDROXIDE 200-200 MG
30 TABLET,CHEWABLE ORAL EVERY 4 HOURS
Refills: 0 | Status: DISCONTINUED | OUTPATIENT
Start: 2025-06-26 | End: 2025-06-26

## 2025-06-26 RX ADMIN — Medication 81 MILLIGRAM(S): at 12:01

## 2025-06-26 RX ADMIN — CARVEDILOL 12.5 MILLIGRAM(S): 3.12 TABLET, FILM COATED ORAL at 08:39

## 2025-06-26 RX ADMIN — INSULIN LISPRO 2: 100 INJECTION, SOLUTION INTRAVENOUS; SUBCUTANEOUS at 08:32

## 2025-06-26 RX ADMIN — Medication 3 MILLILITER(S): at 06:31

## 2025-06-26 RX ADMIN — INSULIN LISPRO 3: 100 INJECTION, SOLUTION INTRAVENOUS; SUBCUTANEOUS at 12:03

## 2025-06-26 RX ADMIN — Medication 30 MILLILITER(S): at 06:16

## 2025-06-26 RX ADMIN — Medication 40 MILLIGRAM(S): at 05:12

## 2025-06-26 RX ADMIN — AMLODIPINE BESYLATE 5 MILLIGRAM(S): 10 TABLET ORAL at 05:12

## 2025-06-26 RX ADMIN — Medication 100 MILLIGRAM(S): at 08:42

## 2025-06-26 RX ADMIN — CLOPIDOGREL BISULFATE 75 MILLIGRAM(S): 75 TABLET, FILM COATED ORAL at 12:00

## 2025-06-26 NOTE — DISCHARGE NOTE PROVIDER - NSDCFUSCHEDAPPT_GEN_ALL_CORE_FT
Left:/hip [facility] Physician [facility]  GERIATRICS [address] [place] [address]   Scheduled Appointment: 07/29/2025

## 2025-06-26 NOTE — DISCHARGE NOTE PROVIDER - HOSPITAL COURSE
69 y.o. male with PMH of CAD, HTN, HLD, Diabetes Mellitus 2,  presents today for elective PCI to RCA. The patient c/o L sided chest pain on and off, mostly at night. The patient denies SOB, palpitations, presyncope, syncope,  headache, visual disturbances, CVA, PE, DVT, [place], abdominal pain, N/V/D/C, hematochezia, melena, dysuria, hematuria, fever, chills. The patient is s/p cardiac cath in 5/2025, was found to have RCA 80%, did not have PCI as needed to have densensitization to Aspirin. At present, the patient is on ASA and Plavix daily.   6/25 Cardiac cath - PCI to RCA with stent x1

## 2025-06-26 NOTE — DISCHARGE NOTE PROVIDER - CARE PROVIDER_API CALL
Lopez Deras  Cardiovascular Disease  [address] [place] [address]  Phone: [phone]  Fax: [phone]  Established Patient  Follow Up Time: 2 weeks

## 2025-06-26 NOTE — DISCHARGE NOTE NURSING/CASE MANAGEMENT/SOCIAL WORK - PATIENT PORTAL LINK FT
You can access the FollowMyHealth Patient Portal offered by [facility] by registering at the following website: [email or web address]. By joining [facility]’s FollowMyHealth portal, you will also be able to view your health information using other applications (apps) compatible with our system.

## 2025-06-26 NOTE — DISCHARGE NOTE NURSING/CASE MANAGEMENT/SOCIAL WORK - NSDCPEFALRISK_GEN_ALL_CORE
For information on Fall & Injury Prevention, visit: [email or web address] OR  [email or web address] OR  https://www.cdc.gov/steadi/patient.html

## 2025-06-26 NOTE — DISCHARGE NOTE NURSING/CASE MANAGEMENT/SOCIAL WORK - FINANCIAL ASSISTANCE
[facility] provides services at a reduced cost to those who are determined to be eligible through [facility]’s financial assistance program. Information regarding [facility]’s financial assistance program can be found by going to [email or web address] or by calling [phone].

## 2025-06-26 NOTE — DISCHARGE NOTE PROVIDER - NSDCMRMEDTOKEN_GEN_ALL_CORE_FT
amLODIPine 5 mg oral tablet: 1 tab(s) orally once a day  Aspirin Low Dose 81 mg oral delayed release tablet: 1 tab(s) orally once a day  CARDIAC REHABILITATION: 2-3 times/week for 12 weeks  Dx PCI to RCA  carvedilol 12.5 mg oral tablet: 1 tab(s) orally 2 times a day  hydrALAZINE 100 mg oral tablet: 1 tab(s) orally once a day (in the morning)  hydrALAZINE 50 mg oral tablet: 1 tab(s) orally once a day (in the evening)  Lantus Solostar Pen 100 units/mL subcutaneous solution: 28 subcutaneous once a day (at bedtime)  Linzess 145 mcg oral capsule: 1 cap(s) orally once a day  Mounjaro 2.5 mg/0.5 mL subcutaneous solution: 2.5 milligram(s) subcutaneously once a week  pantoprazole 40 mg oral delayed release tablet: 1 tab(s) orally once a day  Plavix 75 mg oral tablet: 1 tab(s) orally once a day  pravastatin 40 mg oral tablet: 1 tab(s) orally once a day (at bedtime)

## 2025-06-30 ENCOUNTER — RX RENEWAL (OUTPATIENT)
Age: 70
End: 2025-06-30

## 2025-07-03 PROBLEM — I25.10 ATHEROSCLEROTIC HEART DISEASE OF NATIVE CORONARY ARTERY WITHOUT ANGINA PECTORIS: Chronic | Status: ACTIVE | Noted: 2025-06-25

## 2025-07-07 ENCOUNTER — NON-APPOINTMENT (OUTPATIENT)
Age: 70
End: 2025-07-07

## 2025-07-07 ENCOUNTER — APPOINTMENT (OUTPATIENT)
Dept: CARDIOLOGY | Facility: CLINIC | Age: 70
End: 2025-07-07

## 2025-07-29 ENCOUNTER — APPOINTMENT (OUTPATIENT)
Dept: GERIATRICS | Facility: CLINIC | Age: 70
End: 2025-07-29
Payer: MEDICARE

## 2025-07-29 VITALS
BODY MASS INDEX: 31.67 KG/M2 | HEART RATE: 69 BPM | HEIGHT: 68 IN | OXYGEN SATURATION: 97 % | WEIGHT: 209 LBS | SYSTOLIC BLOOD PRESSURE: 108 MMHG | TEMPERATURE: 97.7 F | RESPIRATION RATE: 16 BRPM | DIASTOLIC BLOOD PRESSURE: 62 MMHG

## 2025-07-29 DIAGNOSIS — I70.90 UNSPECIFIED ATHEROSCLEROSIS: ICD-10-CM

## 2025-07-29 DIAGNOSIS — E87.1 HYPO-OSMOLALITY AND HYPONATREMIA: ICD-10-CM

## 2025-07-29 DIAGNOSIS — I99.8 OTHER DISORDER OF CIRCULATORY SYSTEM: ICD-10-CM

## 2025-07-29 PROCEDURE — 99215 OFFICE O/P EST HI 40 MIN: CPT | Mod: GC

## 2025-07-29 PROCEDURE — G2211 COMPLEX E/M VISIT ADD ON: CPT

## 2025-07-31 LAB
ALBUMIN SERPL ELPH-MCNC: 4.2 G/DL
ALBUMIN, RANDOM URINE: 3.4 MG/DL
ALP BLD-CCNC: 68 U/L
ALT SERPL-CCNC: 20 U/L
ANION GAP SERPL CALC-SCNC: 12 MMOL/L
AST SERPL-CCNC: 19 U/L
BASOPHILS # BLD AUTO: 0.06 K/UL
BASOPHILS NFR BLD AUTO: 0.8 %
BILIRUB SERPL-MCNC: 0.4 MG/DL
BUN SERPL-MCNC: 12 MG/DL
CALCIUM SERPL-MCNC: 9.5 MG/DL
CHLORIDE SERPL-SCNC: 98 MMOL/L
CHOLEST SERPL-MCNC: 131 MG/DL
CO2 SERPL-SCNC: 24 MMOL/L
CREAT SERPL-MCNC: 0.89 MG/DL
CREAT SPEC-SCNC: 195 MG/DL
EGFRCR SERPLBLD CKD-EPI 2021: 92 ML/MIN/1.73M2
EOSINOPHIL # BLD AUTO: 0.18 K/UL
EOSINOPHIL NFR BLD AUTO: 2.3 %
ESTIMATED AVERAGE GLUCOSE: 186 MG/DL
GLUCOSE SERPL-MCNC: 161 MG/DL
HBA1C MFR BLD HPLC: 8.1 %
HCT VFR BLD CALC: 43.6 %
HDLC SERPL-MCNC: 47 MG/DL
HGB BLD-MCNC: 13.7 G/DL
IMM GRANULOCYTES NFR BLD AUTO: 0.3 %
LDLC SERPL-MCNC: 72 MG/DL
LYMPHOCYTES # BLD AUTO: 2.16 K/UL
LYMPHOCYTES NFR BLD AUTO: 27.4 %
MAN DIFF?: NORMAL
MCHC RBC-ENTMCNC: 27.9 PG
MCHC RBC-ENTMCNC: 31.4 G/DL
MCV RBC AUTO: 88.8 FL
MICROALBUMIN/CREAT 24H UR-RTO: 17 MG/G
MONOCYTES # BLD AUTO: 0.8 K/UL
MONOCYTES NFR BLD AUTO: 10.2 %
NEUTROPHILS # BLD AUTO: 4.65 K/UL
NEUTROPHILS NFR BLD AUTO: 59 %
NONHDLC SERPL-MCNC: 84 MG/DL
OSMOLALITY SERPL: 282 MOSMOL/KG
OSMOLALITY UR: 584 MOSM/KG
PLATELET # BLD AUTO: 218 K/UL
POTASSIUM SERPL-SCNC: 4.7 MMOL/L
PROT SERPL-MCNC: 7 G/DL
RBC # BLD: 4.91 M/UL
RBC # FLD: 13.3 %
SODIUM ?TM SUB UR QN: <20 MMOL/L
SODIUM SERPL-SCNC: 135 MMOL/L
TRIGL SERPL-MCNC: 56 MG/DL
URATE UR-MCNC: 64.3 MG/DL
WBC # FLD AUTO: 7.87 K/UL

## 2025-08-12 ENCOUNTER — APPOINTMENT (OUTPATIENT)
Dept: GERIATRICS | Facility: CLINIC | Age: 70
End: 2025-08-12
Payer: MEDICARE

## 2025-08-12 VITALS
HEART RATE: 71 BPM | BODY MASS INDEX: 31.63 KG/M2 | TEMPERATURE: 97.1 F | RESPIRATION RATE: 13 BRPM | DIASTOLIC BLOOD PRESSURE: 75 MMHG | SYSTOLIC BLOOD PRESSURE: 163 MMHG | WEIGHT: 208 LBS | OXYGEN SATURATION: 96 %

## 2025-08-12 DIAGNOSIS — E11.8 TYPE 2 DIABETES MELLITUS WITH UNSPECIFIED COMPLICATIONS: ICD-10-CM

## 2025-08-12 DIAGNOSIS — I49.9 CARDIAC ARRHYTHMIA, UNSPECIFIED: ICD-10-CM

## 2025-08-12 DIAGNOSIS — I10 ESSENTIAL (PRIMARY) HYPERTENSION: ICD-10-CM

## 2025-08-12 DIAGNOSIS — I25.10 ATHEROSCLEROTIC HEART DISEASE OF NATIVE CORONARY ARTERY W/OUT ANGINA PECTORIS: ICD-10-CM

## 2025-08-12 DIAGNOSIS — R37 SEXUAL DYSFUNCTION, UNSPECIFIED: ICD-10-CM

## 2025-08-12 DIAGNOSIS — E78.5 HYPERLIPIDEMIA, UNSPECIFIED: ICD-10-CM

## 2025-08-12 PROCEDURE — G2211 COMPLEX E/M VISIT ADD ON: CPT

## 2025-08-12 PROCEDURE — 99215 OFFICE O/P EST HI 40 MIN: CPT | Mod: GC

## 2025-08-12 RX ORDER — TIRZEPATIDE 5 MG/.5ML
5 INJECTION, SOLUTION SUBCUTANEOUS
Qty: 1 | Refills: 3 | Status: ACTIVE | COMMUNITY
Start: 2025-08-12 | End: 1900-01-01

## 2025-08-12 RX ORDER — HYDRALAZINE HYDROCHLORIDE 50 MG/1
50 TABLET ORAL
Qty: 7 | Refills: 0 | Status: ACTIVE | COMMUNITY
Start: 2025-08-12 | End: 1900-01-01

## 2025-08-12 RX ORDER — LISINOPRIL 20 MG/1
20 TABLET ORAL
Qty: 90 | Refills: 3 | Status: ACTIVE | COMMUNITY
Start: 2025-08-12 | End: 1900-01-01

## 2025-08-15 ENCOUNTER — APPOINTMENT (OUTPATIENT)
Dept: ENDOCRINOLOGY | Facility: CLINIC | Age: 70
End: 2025-08-15

## 2025-08-15 DIAGNOSIS — E11.9 TYPE 2 DIABETES MELLITUS W/OUT COMPLICATIONS: ICD-10-CM

## 2025-08-15 PROBLEM — I49.9 ARRHYTHMIA: Status: ACTIVE | Noted: 2025-07-29

## 2025-08-15 PROCEDURE — G0108 DIAB MANAGE TRN  PER INDIV: CPT

## 2025-08-15 PROCEDURE — 95251 CONT GLUC MNTR ANALYSIS I&R: CPT

## 2025-09-02 ENCOUNTER — APPOINTMENT (OUTPATIENT)
Dept: GERIATRICS | Facility: CLINIC | Age: 70
End: 2025-09-02
Payer: MEDICARE

## 2025-09-02 VITALS
WEIGHT: 216 LBS | HEIGHT: 68 IN | HEART RATE: 76 BPM | OXYGEN SATURATION: 97 % | DIASTOLIC BLOOD PRESSURE: 71 MMHG | SYSTOLIC BLOOD PRESSURE: 166 MMHG | TEMPERATURE: 98.3 F | RESPIRATION RATE: 16 BRPM | BODY MASS INDEX: 32.74 KG/M2

## 2025-09-02 DIAGNOSIS — R10.9 UNSPECIFIED ABDOMINAL PAIN: ICD-10-CM

## 2025-09-02 DIAGNOSIS — E11.8 TYPE 2 DIABETES MELLITUS WITH UNSPECIFIED COMPLICATIONS: ICD-10-CM

## 2025-09-02 DIAGNOSIS — I10 ESSENTIAL (PRIMARY) HYPERTENSION: ICD-10-CM

## 2025-09-02 DIAGNOSIS — E87.70 FLUID OVERLOAD, UNSPECIFIED: ICD-10-CM

## 2025-09-02 DIAGNOSIS — I25.10 ATHEROSCLEROTIC HEART DISEASE OF NATIVE CORONARY ARTERY W/OUT ANGINA PECTORIS: ICD-10-CM

## 2025-09-02 PROCEDURE — G2211 COMPLEX E/M VISIT ADD ON: CPT

## 2025-09-02 PROCEDURE — 99214 OFFICE O/P EST MOD 30 MIN: CPT | Mod: GC

## 2025-09-02 RX ORDER — VALSARTAN 40 MG/1
40 TABLET, COATED ORAL DAILY
Qty: 30 | Refills: 3 | Status: ACTIVE | COMMUNITY
Start: 2025-09-02 | End: 1900-01-01

## 2025-09-05 LAB
ALBUMIN SERPL ELPH-MCNC: 4.1 G/DL
ALP BLD-CCNC: 63 U/L
ALT SERPL-CCNC: 20 U/L
ANION GAP SERPL CALC-SCNC: 14 MMOL/L
AST SERPL-CCNC: 22 U/L
BILIRUB SERPL-MCNC: 0.4 MG/DL
BUN SERPL-MCNC: 14 MG/DL
CALCIUM SERPL-MCNC: 9.1 MG/DL
CHLORIDE SERPL-SCNC: 99 MMOL/L
CO2 SERPL-SCNC: 24 MMOL/L
CREAT SERPL-MCNC: 0.93 MG/DL
EGFRCR SERPLBLD CKD-EPI 2021: 88 ML/MIN/1.73M2
GLUCOSE SERPL-MCNC: 175 MG/DL
NT-PROBNP SERPL-MCNC: 218 PG/ML
POTASSIUM SERPL-SCNC: 4.9 MMOL/L
PROT SERPL-MCNC: 6.8 G/DL
SODIUM SERPL-SCNC: 137 MMOL/L

## 2025-09-12 ENCOUNTER — APPOINTMENT (OUTPATIENT)
Dept: ULTRASOUND IMAGING | Facility: IMAGING CENTER | Age: 70
End: 2025-09-12
Payer: MEDICARE

## 2025-09-12 PROCEDURE — 93975 VASCULAR STUDY: CPT | Mod: 26
